# Patient Record
Sex: MALE | Race: BLACK OR AFRICAN AMERICAN | NOT HISPANIC OR LATINO | Employment: UNEMPLOYED | URBAN - METROPOLITAN AREA
[De-identification: names, ages, dates, MRNs, and addresses within clinical notes are randomized per-mention and may not be internally consistent; named-entity substitution may affect disease eponyms.]

---

## 2017-01-06 ENCOUNTER — OFFICE VISIT - HEALTHEAST (OUTPATIENT)
Dept: INTERNAL MEDICINE | Facility: CLINIC | Age: 37
End: 2017-01-06

## 2017-01-06 DIAGNOSIS — I10 HYPERTENSION: ICD-10-CM

## 2017-01-06 DIAGNOSIS — F98.8 ADD (ATTENTION DEFICIT DISORDER): ICD-10-CM

## 2017-01-06 DIAGNOSIS — F32.A DEPRESSION: ICD-10-CM

## 2017-01-06 RX ORDER — TADALAFIL 20 MG/1
20 TABLET ORAL
Status: SHIPPED | COMMUNITY
Start: 2016-11-17 | End: 2022-05-24 | Stop reason: ALTCHOICE

## 2017-03-24 ENCOUNTER — COMMUNICATION - HEALTHEAST (OUTPATIENT)
Dept: INTERNAL MEDICINE | Facility: CLINIC | Age: 37
End: 2017-03-24

## 2018-07-03 ENCOUNTER — COMMUNICATION - HEALTHEAST (OUTPATIENT)
Dept: INTERNAL MEDICINE | Facility: CLINIC | Age: 38
End: 2018-07-03

## 2018-07-03 DIAGNOSIS — N52.9 ERECTILE DYSFUNCTION, UNSPECIFIED ERECTILE DYSFUNCTION TYPE: ICD-10-CM

## 2018-08-14 ENCOUNTER — COMMUNICATION - HEALTHEAST (OUTPATIENT)
Dept: INTERNAL MEDICINE | Facility: CLINIC | Age: 38
End: 2018-08-14

## 2018-08-14 DIAGNOSIS — F98.8 ADD (ATTENTION DEFICIT DISORDER): ICD-10-CM

## 2019-01-07 ENCOUNTER — COMMUNICATION - HEALTHEAST (OUTPATIENT)
Dept: SCHEDULING | Facility: CLINIC | Age: 39
End: 2019-01-07

## 2019-01-07 ENCOUNTER — OFFICE VISIT - HEALTHEAST (OUTPATIENT)
Dept: INTERNAL MEDICINE | Facility: CLINIC | Age: 39
End: 2019-01-07

## 2019-01-07 DIAGNOSIS — F11.11 HISTORY OF OPIOID ABUSE (H): ICD-10-CM

## 2019-01-07 DIAGNOSIS — M54.42 CHRONIC BILATERAL LOW BACK PAIN WITH BILATERAL SCIATICA: ICD-10-CM

## 2019-01-07 DIAGNOSIS — M54.41 CHRONIC BILATERAL LOW BACK PAIN WITH BILATERAL SCIATICA: ICD-10-CM

## 2019-01-07 DIAGNOSIS — G89.29 CHRONIC BILATERAL LOW BACK PAIN WITH BILATERAL SCIATICA: ICD-10-CM

## 2019-01-07 DIAGNOSIS — I10 ESSENTIAL HYPERTENSION: ICD-10-CM

## 2019-01-07 DIAGNOSIS — F90.2 ATTENTION DEFICIT HYPERACTIVITY DISORDER (ADHD), COMBINED TYPE: ICD-10-CM

## 2019-01-07 DIAGNOSIS — F33.1 MODERATE EPISODE OF RECURRENT MAJOR DEPRESSIVE DISORDER (H): ICD-10-CM

## 2019-01-07 ASSESSMENT — MIFFLIN-ST. JEOR: SCORE: 1666.03

## 2019-01-08 ENCOUNTER — AMBULATORY - HEALTHEAST (OUTPATIENT)
Dept: LAB | Facility: CLINIC | Age: 39
End: 2019-01-08

## 2019-01-08 DIAGNOSIS — F90.2 ATTENTION DEFICIT HYPERACTIVITY DISORDER, COMBINED TYPE: ICD-10-CM

## 2019-01-08 LAB
AMPHETAMINES UR QL SCN: NORMAL
BARBITURATES UR QL: NORMAL
BENZODIAZ UR QL: NORMAL
CANNABINOIDS UR QL SCN: NORMAL
COCAINE UR QL: NORMAL
CREAT UR-MCNC: 15.4 MG/DL
ERYTHROCYTE [DISTWIDTH] IN BLOOD BY AUTOMATED COUNT: 11.9 % (ref 11–14.5)
HCT VFR BLD AUTO: 47.9 % (ref 40–54)
HGB BLD-MCNC: 16.2 G/DL (ref 14–18)
MCH RBC QN AUTO: 29.8 PG (ref 27–34)
MCHC RBC AUTO-ENTMCNC: 33.9 G/DL (ref 32–36)
MCV RBC AUTO: 88 FL (ref 80–100)
METHADONE UR QL SCN: NORMAL
OPIATES UR QL SCN: NORMAL
OXYCODONE UR QL: NORMAL
PCP UR QL SCN: NORMAL
PLATELET # BLD AUTO: 332 THOU/UL (ref 140–440)
PMV BLD AUTO: 7.2 FL (ref 7–10)
RBC # BLD AUTO: 5.44 MILL/UL (ref 4.4–6.2)
WBC: 6.3 THOU/UL (ref 4–11)

## 2019-01-09 LAB
ALBUMIN SERPL-MCNC: 4 G/DL (ref 3.5–5)
ALP SERPL-CCNC: 74 U/L (ref 45–120)
ALT SERPL W P-5'-P-CCNC: 35 U/L (ref 0–45)
ANION GAP SERPL CALCULATED.3IONS-SCNC: 10 MMOL/L (ref 5–18)
AST SERPL W P-5'-P-CCNC: 22 U/L (ref 0–40)
BILIRUB SERPL-MCNC: 0.4 MG/DL (ref 0–1)
BUN SERPL-MCNC: 9 MG/DL (ref 8–22)
CALCIUM SERPL-MCNC: 9.3 MG/DL (ref 8.5–10.5)
CHLORIDE BLD-SCNC: 104 MMOL/L (ref 98–107)
CO2 SERPL-SCNC: 27 MMOL/L (ref 22–31)
CREAT SERPL-MCNC: 1.3 MG/DL (ref 0.7–1.3)
GFR SERPL CREATININE-BSD FRML MDRD: >60 ML/MIN/1.73M2
GLUCOSE BLD-MCNC: 143 MG/DL (ref 70–125)
POTASSIUM BLD-SCNC: 4.1 MMOL/L (ref 3.5–5)
PROT SERPL-MCNC: 7.1 G/DL (ref 6–8)
SODIUM SERPL-SCNC: 141 MMOL/L (ref 136–145)
TSH SERPL DL<=0.005 MIU/L-ACNC: 0.94 UIU/ML (ref 0.3–5)
VIT B12 SERPL-MCNC: 447 PG/ML (ref 213–816)

## 2019-01-10 ENCOUNTER — COMMUNICATION - HEALTHEAST (OUTPATIENT)
Dept: INTERNAL MEDICINE | Facility: CLINIC | Age: 39
End: 2019-01-10

## 2019-01-10 DIAGNOSIS — F90.2 ATTENTION DEFICIT HYPERACTIVITY DISORDER (ADHD), COMBINED TYPE: ICD-10-CM

## 2019-01-10 LAB — 25(OH)D3 SERPL-MCNC: 5.9 NG/ML (ref 30–80)

## 2019-01-11 ENCOUNTER — COMMUNICATION - HEALTHEAST (OUTPATIENT)
Dept: INTERNAL MEDICINE | Facility: CLINIC | Age: 39
End: 2019-01-11

## 2019-02-27 ENCOUNTER — AMBULATORY - HEALTHEAST (OUTPATIENT)
Dept: BEHAVIORAL HEALTH | Facility: CLINIC | Age: 39
End: 2019-02-27

## 2019-03-20 ENCOUNTER — COMMUNICATION - HEALTHEAST (OUTPATIENT)
Dept: SCHEDULING | Facility: CLINIC | Age: 39
End: 2019-03-20

## 2019-04-01 ENCOUNTER — COMMUNICATION - HEALTHEAST (OUTPATIENT)
Dept: INTERNAL MEDICINE | Facility: CLINIC | Age: 39
End: 2019-04-01

## 2019-04-01 ENCOUNTER — OFFICE VISIT - HEALTHEAST (OUTPATIENT)
Dept: INTERNAL MEDICINE | Facility: CLINIC | Age: 39
End: 2019-04-01

## 2019-04-01 DIAGNOSIS — M54.42 CHRONIC BILATERAL LOW BACK PAIN WITH BILATERAL SCIATICA: ICD-10-CM

## 2019-04-01 DIAGNOSIS — N52.9 ERECTILE DYSFUNCTION, UNSPECIFIED ERECTILE DYSFUNCTION TYPE: ICD-10-CM

## 2019-04-01 DIAGNOSIS — M54.50 ACUTE LEFT-SIDED LOW BACK PAIN WITHOUT SCIATICA: ICD-10-CM

## 2019-04-01 DIAGNOSIS — M54.41 CHRONIC BILATERAL LOW BACK PAIN WITH BILATERAL SCIATICA: ICD-10-CM

## 2019-04-01 DIAGNOSIS — I10 ESSENTIAL HYPERTENSION: ICD-10-CM

## 2019-04-01 DIAGNOSIS — G89.29 CHRONIC BILATERAL LOW BACK PAIN WITH BILATERAL SCIATICA: ICD-10-CM

## 2019-06-11 ENCOUNTER — COMMUNICATION - HEALTHEAST (OUTPATIENT)
Dept: INTERNAL MEDICINE | Facility: CLINIC | Age: 39
End: 2019-06-11

## 2019-07-17 ENCOUNTER — COMMUNICATION - HEALTHEAST (OUTPATIENT)
Dept: INTERNAL MEDICINE | Facility: CLINIC | Age: 39
End: 2019-07-17

## 2020-08-04 ENCOUNTER — OFFICE VISIT - HEALTHEAST (OUTPATIENT)
Dept: INTERNAL MEDICINE | Facility: CLINIC | Age: 40
End: 2020-08-04

## 2020-08-04 ENCOUNTER — COMMUNICATION - HEALTHEAST (OUTPATIENT)
Dept: INTERNAL MEDICINE | Facility: CLINIC | Age: 40
End: 2020-08-04

## 2020-08-04 ENCOUNTER — COMMUNICATION - HEALTHEAST (OUTPATIENT)
Dept: LAB | Facility: CLINIC | Age: 40
End: 2020-08-04

## 2020-08-04 DIAGNOSIS — A54.9 GONORRHEA: ICD-10-CM

## 2020-08-04 DIAGNOSIS — G47.10 EXCESSIVE SOMNOLENCE DISORDER: ICD-10-CM

## 2020-08-04 DIAGNOSIS — Z23 NEED FOR PROPHYLACTIC VACCINATION WITH TETANUS-DIPHTHERIA (TD): ICD-10-CM

## 2020-08-04 DIAGNOSIS — Z11.4 ENCOUNTER FOR SCREENING FOR HIV: ICD-10-CM

## 2020-08-04 DIAGNOSIS — Z13.220 LIPID SCREENING: ICD-10-CM

## 2020-08-04 DIAGNOSIS — F33.2 SEVERE EPISODE OF RECURRENT MAJOR DEPRESSIVE DISORDER, WITHOUT PSYCHOTIC FEATURES (H): ICD-10-CM

## 2020-08-04 DIAGNOSIS — M54.42 CHRONIC BILATERAL LOW BACK PAIN WITH BILATERAL SCIATICA: ICD-10-CM

## 2020-08-04 DIAGNOSIS — M54.41 CHRONIC BILATERAL LOW BACK PAIN WITH BILATERAL SCIATICA: ICD-10-CM

## 2020-08-04 DIAGNOSIS — Z51.81 ENCOUNTER FOR THERAPEUTIC DRUG MONITORING: ICD-10-CM

## 2020-08-04 DIAGNOSIS — F90.2 ATTENTION DEFICIT HYPERACTIVITY DISORDER (ADHD), COMBINED TYPE: ICD-10-CM

## 2020-08-04 DIAGNOSIS — N52.9 ERECTILE DYSFUNCTION, UNSPECIFIED ERECTILE DYSFUNCTION TYPE: ICD-10-CM

## 2020-08-04 DIAGNOSIS — I10 ESSENTIAL HYPERTENSION: ICD-10-CM

## 2020-08-04 DIAGNOSIS — G89.29 CHRONIC BILATERAL LOW BACK PAIN WITH BILATERAL SCIATICA: ICD-10-CM

## 2020-08-04 LAB
ERYTHROCYTE [DISTWIDTH] IN BLOOD BY AUTOMATED COUNT: 12.6 % (ref 11–14.5)
HCT VFR BLD AUTO: 48.7 % (ref 40–54)
HGB BLD-MCNC: 16.1 G/DL (ref 14–18)
MCH RBC QN AUTO: 30 PG (ref 27–34)
MCHC RBC AUTO-ENTMCNC: 33 G/DL (ref 32–36)
MCV RBC AUTO: 91 FL (ref 80–100)
PLATELET # BLD AUTO: 360 THOU/UL (ref 140–440)
PMV BLD AUTO: 7.3 FL (ref 7–10)
RBC # BLD AUTO: 5.35 MILL/UL (ref 4.4–6.2)
WBC: 5.2 THOU/UL (ref 4–11)

## 2020-08-04 RX ORDER — BUPROPION HYDROCHLORIDE 150 MG/1
150 TABLET ORAL EVERY MORNING
Qty: 30 TABLET | Refills: 11 | Status: SHIPPED | OUTPATIENT
Start: 2020-08-04 | End: 2021-08-04

## 2020-08-04 ASSESSMENT — MIFFLIN-ST. JEOR: SCORE: 1689.84

## 2020-08-04 ASSESSMENT — PATIENT HEALTH QUESTIONNAIRE - PHQ9: SUM OF ALL RESPONSES TO PHQ QUESTIONS 1-9: 16

## 2020-08-05 LAB
ALBUMIN SERPL-MCNC: 4.2 G/DL (ref 3.5–5)
ALP SERPL-CCNC: 77 U/L (ref 45–120)
ALT SERPL W P-5'-P-CCNC: 20 U/L (ref 0–45)
ANION GAP SERPL CALCULATED.3IONS-SCNC: 13 MMOL/L (ref 5–18)
AST SERPL W P-5'-P-CCNC: 17 U/L (ref 0–40)
BILIRUB SERPL-MCNC: 0.4 MG/DL (ref 0–1)
BUN SERPL-MCNC: 13 MG/DL (ref 8–22)
CALCIUM SERPL-MCNC: 9.7 MG/DL (ref 8.5–10.5)
CHLORIDE BLD-SCNC: 101 MMOL/L (ref 98–107)
CO2 SERPL-SCNC: 26 MMOL/L (ref 22–31)
CREAT SERPL-MCNC: 1.51 MG/DL (ref 0.7–1.3)
GFR SERPL CREATININE-BSD FRML MDRD: 51 ML/MIN/1.73M2
GLUCOSE BLD-MCNC: 99 MG/DL (ref 70–125)
HIV 1+2 AB+HIV1 P24 AG SERPL QL IA: NEGATIVE
POTASSIUM BLD-SCNC: 4.2 MMOL/L (ref 3.5–5)
PROLACTIN SERPL-MCNC: 7.6 NG/ML (ref 0–15)
PROT SERPL-MCNC: 7.4 G/DL (ref 6–8)
SODIUM SERPL-SCNC: 140 MMOL/L (ref 136–145)
TSH SERPL DL<=0.005 MIU/L-ACNC: 1 UIU/ML (ref 0.3–5)

## 2020-08-07 LAB — TESTOST SERPL-MCNC: 695 NG/DL (ref 240–871)

## 2021-01-09 ENCOUNTER — COMMUNICATION - HEALTHEAST (OUTPATIENT)
Dept: INTERNAL MEDICINE | Facility: CLINIC | Age: 41
End: 2021-01-09

## 2021-01-09 DIAGNOSIS — N52.9 ERECTILE DYSFUNCTION, UNSPECIFIED ERECTILE DYSFUNCTION TYPE: ICD-10-CM

## 2021-01-09 DIAGNOSIS — I10 ESSENTIAL HYPERTENSION: ICD-10-CM

## 2021-01-09 RX ORDER — HYDROCHLOROTHIAZIDE 12.5 MG/1
12.5 TABLET ORAL DAILY
Qty: 90 TABLET | Refills: 1 | Status: SHIPPED | OUTPATIENT
Start: 2021-01-09 | End: 2021-08-04

## 2021-01-09 RX ORDER — SILDENAFIL 100 MG/1
TABLET, FILM COATED ORAL
Qty: 20 TABLET | Refills: 3 | Status: SHIPPED | OUTPATIENT
Start: 2021-01-09 | End: 2021-08-04

## 2021-01-09 RX ORDER — LISINOPRIL 20 MG/1
20 TABLET ORAL DAILY
Qty: 90 TABLET | Refills: 1 | Status: SHIPPED | OUTPATIENT
Start: 2021-01-09 | End: 2021-08-04

## 2021-04-30 ENCOUNTER — COMMUNICATION - HEALTHEAST (OUTPATIENT)
Dept: INTERNAL MEDICINE | Facility: CLINIC | Age: 41
End: 2021-04-30

## 2021-04-30 ENCOUNTER — AMBULATORY - HEALTHEAST (OUTPATIENT)
Dept: FAMILY MEDICINE | Facility: CLINIC | Age: 41
End: 2021-04-30

## 2021-04-30 DIAGNOSIS — B00.9 HERPES SIMPLEX VIRUS INFECTION: ICD-10-CM

## 2021-05-27 ASSESSMENT — PATIENT HEALTH QUESTIONNAIRE - PHQ9: SUM OF ALL RESPONSES TO PHQ QUESTIONS 1-9: 16
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
## 2021-05-30 VITALS — WEIGHT: 177.9 LBS | BODY MASS INDEX: 30.3 KG/M2

## 2021-06-02 ENCOUNTER — RECORDS - HEALTHEAST (OUTPATIENT)
Dept: ADMINISTRATIVE | Facility: CLINIC | Age: 41
End: 2021-06-02

## 2021-06-02 VITALS — WEIGHT: 185 LBS | BODY MASS INDEX: 30.82 KG/M2 | HEIGHT: 65 IN

## 2021-06-02 VITALS — WEIGHT: 191 LBS | BODY MASS INDEX: 31.78 KG/M2

## 2021-06-04 VITALS
HEIGHT: 66 IN | WEIGHT: 186.75 LBS | SYSTOLIC BLOOD PRESSURE: 154 MMHG | DIASTOLIC BLOOD PRESSURE: 100 MMHG | OXYGEN SATURATION: 98 % | HEART RATE: 98 BPM | RESPIRATION RATE: 16 BRPM | BODY MASS INDEX: 30.01 KG/M2

## 2021-06-08 NOTE — PROGRESS NOTES
ASSESSMENT:  1.  Adult attention deficit disorder:  Patient wishes to go back on Adderall.  He reports he previously did well on 30 mg twice daily.  Reports had trouble sleeping on the sustained release preparation in the past.  He does appear extremely agitated and hyperactive with pressured speech.  2.  Major depression:  He scores 19 on PHQ-9.  He denies suicidal intent.  He is no longer taking Viibryd or other antidepressants.  Reports that he felt he did well on Paxil in the past.  He has some characteristics that would suggest a bipolar disorder.  He reports he was never diagnosed with this in the past.  3.  Low back pain:  He is no longer taking gabapentin.  He did not feel it was of any benefit.  He is not interested in any evaluation of the back at the present time.  4.  Hypertension:  Blood pressure is significantly elevated.  He has run out of lisinopril.  He reports that he had excellent control with this in the past.      PLAN:  1.  Refill Adderall 30 mg twice daily.  A one-month prescription was authorized.  2.  Restart lisinopril 20 mg daily  3.  Paxil 20 mg one half daily for 6 days then 20 mg daily.  4.  Clinic follow-up in one month to assess medication changes.  He may also benefit from a mood stabilizer in the future              ASSESSED PROBLEMS:  1. Depression  PARoxetine (PAXIL) 20 MG tablet   2. ADD (attention deficit disorder)  dextroamphetamine-amphetamine (ADDERALL) 30 mg Tab   3. Hypertension  lisinopril (PRINIVIL,ZESTRIL) 20 MG tablet       CHIEF COMPLAINT:  Chief Complaint   Patient presents with     Follow-up     refill meds       HISTORY OF PRESENT ILLNESS:  Zacarias is a 36 y.o. male presenting to the clinic today for a mediation refill.      Hypertension: He has not been taking Lisinopril lately but states that it was effective when he was taking it.     ADD: Since November 2017 he has not taken any ADD medications because he was awaiting a referal to a new mental health practitioner  at [facility].  He feels that his life has been chaotic and regressing somewhat lately. He attributes much of his distress to the psychiatrist that he saw at [facility] who did not provide an assessment or plan in her notes, prompting Dr. López to deny any prescription for Adderall. He returned to see her and, by his report and records, became very upset that she had not provided affirmation for Adderall prescription.She attempted to prescribed him extended release Adderall. The tablet form of Adderall has been effective but extended release has caused insomnia and lags throughout the day.     Insomnia: He has been taking amitriptyline at bed time.     Depression:  He stated that he is feeling very depressed; he can not hold a job and that it was better when he was nery to focus better when on the short acting Adderall. He is unsure if he is taking any antidepressants right now. His psychiatrist prescribed Lexapro to him but he is unsure if he is taking that. He recalls that Paxil has been more effective in the past.  He adds that he had no side effects while taking Paxil, while most other antidepressants have caused issues.   He states that he has been on mood stabilizers in the past with good results. He is unsure if he has a history of bipolar.     ED: He has not been able to obtain Viagra or Cialis as he was told by his pharmacy, when presenting his coupon, that insurance would cover it for free. Of note, he is no longer taking acyclovir but would like a refill in case he has a flare of herpes.     Back pain: He reports that he saw someone for back pain at the [facility] but did not feel that his issues were fully addressed. He is no longer taking gabapentin.      REVIEW OF SYSTEMS:   He denies any heart fluttering.   All other systems are negative.    PFSH:  He has not been able to hold a job recently.  His mother has a history of bipolar.     TOBACCO USE:  History   Smoking Status     Never Smoker    Smokeless Tobacco     Not on file       VITALS:  Vitals:    01/06/17 1459 01/06/17 1507   BP: (!) 130/100 (!) 134/94   Patient Site: Left Arm Left Arm   Patient Position: Sitting Sitting   Cuff Size: Adult Regular Adult Regular   Pulse: (!) 120    Weight: 177 lb 14.4 oz (80.7 kg)      Wt Readings from Last 3 Encounters:   01/06/17 177 lb 14.4 oz (80.7 kg)   11/16/16 178 lb (80.7 kg)   09/28/16 174 lb 8 oz (79.2 kg)     Body mass index is 30.3 kg/(m^2).    PHYSICAL EXAM:   Constitutional:   Reveals an talkative male who seems very hyperactive with pressured speech, appears somewhat agitated. Well groomed..  Vitals: per nursing notes.  Detailed exam not done.     Student Physical Exam:  General: Revealed a well groomed,  male, with very sporadic, agitated, hyperactive speech, inserting occasional swear words.  Vitals Per nursing.  No detailed physical exam completed.    QUALITY MEASURES:  The following are part of a depression follow up plan for the patient:  under care of mental health counselor, coping support assessment, coping support management, mental health care assessment and mental health care management    ADDITIONAL HISTORY SUMMARIZED (2): Notes from Dr. López on 11/16/2016 reviewed regarding ADD. History obtained from Vijay Ferreira regarding ADD and medication management. Notes from CATINA Damian, a psychiatrist practitioner, reviewed regarding ADD.   DECISION TO OBTAIN EXTRA INFORMATION (1): Care everywhere accessed.    RADIOLOGY TESTS (1): None.  LABS (1): None.  MEDICINE TESTS (1): None.  INDEPENDENT REVIEW (2 each): None.     The visit lasted a total of 19 minutes face to face with the patient. Over 50% of the time was spent counseling and educating the patient about ADD. An untimed portion of this visit was spent with Vijay Ferreira, an NP student. This portion was spent gathering history and this history along with the history of Dr. Garcia has been merged as seen above in the HPI,  ROS, and [facility].    I, Gino Delcid, am scribing for and in the presence of, Dr. Garcia.    I, Dr. Garcia, personally performed the services described in this documentation, as scribed by Gino Delcid in my presence, and it is both accurate and complete.    MEDICATIONS:  Current Outpatient Prescriptions   Medication Sig Dispense Refill     tadalafil (CIALIS) 20 MG tablet Take 20 mg by mouth.       acyclovir (ZOVIRAX) 400 MG tablet Take 400 mg by mouth 2 (two) times a day.       amitriptyline (ELAVIL) 25 MG tablet Take 1 tablet (25 mg total) by mouth bedtime. 30 tablet 11     dextroamphetamine-amphetamine (ADDERALL) 30 mg Tab Take 30 mg by mouth 2 (two) times a day. 60 tablet 0     gabapentin (NEURONTIN) 300 MG capsule Take 1 capsule (300 mg total) by mouth 2 (two) times a day. 60 capsule 11     lisinopril (PRINIVIL,ZESTRIL) 20 MG tablet Take 20 mg by mouth daily.       meloxicam (MOBIC) 7.5 MG tablet Take 1 tablet (7.5 mg total) by mouth 2 (two) times a day. 60 tablet 3     naproxen (NAPROSYN) 500 MG tablet Take 1 tablet (500 mg total) by mouth 2 (two) times a day with meals. 60 tablet 2     sertraline (ZOLOFT) 100 MG tablet Take 100 mg by mouth daily.       sildenafil (VIAGRA) 100 MG tablet Take 1 tablet (100 mg total) by mouth as needed for erectile dysfunction. 3 tablet 11     triamcinolone (KENALOG) 0.5 % cream Apply 1 application topically 2 (two) times a day as needed.       vilazodone (VIIBRYD) 40 mg Tab tablet Take 1 tablet (40 mg total) by mouth daily. 30 tablet 11     No current facility-administered medications for this visit.        Total data points: 3.

## 2021-06-10 NOTE — PROGRESS NOTES
Zacarias: Your testosterone level returned in the normal range at 695.  Prolactin and thyroid tests also were in the normal range.  The creatinine, (a kidney test), is mildly elevated at 1.51.  This should be monitored in the future.  It is important to keep your blood pressure in the normal range to avoid kidney injury in the future.  Other labs including your potassium, blood sugar, hemoglobin, and liver enzymes are normal.  A screening test for HIV returned negative.  You will need to check the urine test to evaluate for GC and chlamydia.    Matias Garcia MD

## 2021-06-10 NOTE — PROGRESS NOTES
ASSESSMENT:  1.  Essential hypertension:    Payton previously was treated with lisinopril and HCTZ.  He reports he has been out of these medications for about 6 months.  Blood pressure is high as expected.  He does complain of urinary frequency and urgency.  I would favor cutting HCTZ to 12.5 mg daily and continuing lisinopril.  If blood pressure is above goal as expected on this, amlodipine would be added.    2.  Erectile dysfunction:  Complains of significant issues with this.  Labs will be ordered to check for endocrine abnormalities.  Viagra will be offered.    3.  Major depression:  He describes all sorts of situational stresses.  He scores 16 on PHQ 9.  He has been on multiple antidepressants in the past.  He is concerned about how these could contribute to erectile difficulties.  After discussion, he will try Wellbutrin.  Potential benefits and adverse effects were reviewed at length.    4.  Attention deficit disorder:   Reports that he previously was on immediate release Adderall 30 mg twice daily.  He has been off of this for a number of months.  He certainly has symptoms suggesting attention deficit.  Chart indicates previous issues with chemical dependency.  I would favor checking a urine tox screen prior to therapy    5.  Low back pain:  Radicular.  He acknowledges it is aggravated by situational stress.  He requests hydrocodone.  I would not advise a narcotic.  I also would avoid NSAIDs given his hypertension and GI symptoms.  Stress reduction, traction and exercise were encouraged.    6.  History of gonorrhea:  He was treated for this in [place] but never filled Zithromax.  His girlfriend wonders about re-exposure.  Urine GC and chlamydia will be checked.    7.  Abdominal complaints:  He notes intermittent diarrhea, constipation and abdominal cramping.  I suspect symptoms are related to irritable bowel and aggravated by situational stress.    8.  Excessive somnolence:  He reports that he has problems with  excessive somnolence and has actually dozed off driving and also during intercourse.  He does not have symptoms to suggest obstructive sleep apnea according to his girlfriend.  Sleep evaluation could be considered for the future    PLAN:  1.  Start lisinopril 20 mg daily.  Also start HCTZ 12.5 mg daily    2.  Fill Viagra 100 mg 1/2-1 as needed for erectile dysfunction    3.  Labs as outlined.  He will be notified of test results.    4.  Urine tox screen would need to be done prior to considering restarting Adderall.    5.  Start Wellbutrin  mg daily    6.  Monitor blood pressure.  Reassess in 1 month.  I expect that amlodipine may need to be added.    7.  Further evaluation for probable irritable bowel and sleep disorder in the future.  He was counseled that his best to limit how many medications are started on one visit.  Orders Placed This Encounter   Procedures     Chlamydia trachomatis & Neisseria gonorrhoeae, Amplified Detection     Standing Status:   Future     Standing Expiration Date:   8/4/2021     Order Specific Question:   Specimen Source?     Answer:   Urine     Td, Preservative Free (green label)     HIV Antigen/Antibody Screening [place]     Testosterone, Total     Thyroid Stimulating Hormone (TSH)     Prolactin     Comprehensive Metabolic Panel     HM2(CBC w/o Differential)     Drug Abuse 1+, Urine     Standing Status:   Future     Standing Expiration Date:   8/4/2021     Medications Discontinued During This Encounter   Medication Reason     hydroCHLOROthiazide (HYDRODIURIL) 25 MG tablet Dose adjustment     sildenafil (VIAGRA) 100 MG tablet Reorder     lisinopril (PRINIVIL,ZESTRIL) 20 MG tablet Reorder     triamcinolone (KENALOG) 0.5 % cream Therapy completed     phenazopyridine (PYRIDIUM) 95 MG tablet Therapy completed     ibuprofen (ADVIL,MOTRIN) 800 MG tablet Therapy completed     HYDROcodone-acetaminophen 5-325 mg per tablet Therapy completed     gabapentin (NEURONTIN) 300 MG capsule Therapy  completed     dextroamphetamine-amphetamine (ADDERALL) 30 mg Tab Therapy completed     acyclovir (ZOVIRAX) 400 MG tablet Allergic response       Return in about 4 weeks (around 9/1/2020) for Recheck.    ASSESSED PROBLEMS:  1. Encounter for screening for HIV  HIV Antigen/Antibody Screening [place]   2. Attention deficit hyperactivity disorder (ADHD), combined type     3. Gonorrhea  Chlamydia trachomatis & Neisseria gonorrhoeae, Amplified Detection    CANCELED: Chlamydia trachomatis & Neisseria gonorrhoeae, Amplified Detection   4. Erectile dysfunction, unspecified erectile dysfunction type  sildenafiL (VIAGRA) 100 MG tablet    Testosterone, Total    Thyroid Stimulating Hormone (TSH)    Prolactin   5. Essential hypertension  lisinopriL (PRINIVIL,ZESTRIL) 20 MG tablet    hydroCHLOROthiazide (HYDRODIURIL) 12.5 MG tablet    Comprehensive Metabolic Panel   6. Chronic bilateral low back pain with bilateral sciatica     7. Lipid screening     8. Need for prophylactic vaccination with tetanus-diphtheria (Td)  Td, Preservative Free (green label)   9. Encounter for therapeutic drug monitoring  HM2(CBC w/o Differential)    Drug Abuse 1+, Urine    CANCELED: Drug Abuse 1+, Urine   10. Severe episode of recurrent major depressive disorder, without psychotic features (H)  buPROPion (WELLBUTRIN XL) 150 MG 24 hr tablet   11. Excessive somnolence disorder         CHIEF COMPLAINT:  Chief Complaint   Patient presents with     Follow-up     meds, hypertension     Depression     PHQ 9 = 16       HISTORY OF PRESENT ILLNESS:  Zacarias is a 40 y.o. male who presents with multiple concerns.  I have not seen him for a number of years.  He been treated with the lisinopril and HCTZ for hypertension.  He has been out of this for 6 months.  Blood pressure is high as expected.  He does note urinary frequency and urgency which is a chronic issue..    He also complains of erectile dysfunction.  He had used Viagra or Cialis in the past with some benefit.  " He is concerned about possibly having a low testosterone level    Scores 16 on PHQ 9.  He acknowledges that he is very anxious and has marked situational stressors.  He has been on multiple antidepressant medicines in the past.  He is concerned that these could contribute to his sexual dysfunction    Requests a refill of Adderall.  He formerly took immediate release Adderall twice daily, but has been off for a number of months.  He has had previous issues with chemical dependency.  He was advised that a tox screen needs to be done prior to considering one with Adderall.  Does have multiple symptoms to suggest attention deficit.    He was treated for GC in January.  He does not have any current symptoms now but his girlfriend is concerned about potential infection.  A urine test for GC and chlamydia will be checked.    Complains of excess somnolence.  He states he has dozed off when talking, with sex, and with driving.  His girlfriend reports that he snores but has not noted apnea.    He also complains of episodic abdominal cramping, diarrhea, and periods of constipation.  Acknowledges that this is worse during periods of situational stress.    He notes intermittent low back pain.  Requests a prescription for hydrocodone.  Discomfort seems worse during periods of stress.  It is nonradicular.  I do not see an indication for narcotic at this time.    REVIEW OF SYSTEMS:  He has numerous somatic complaints  Comprehensive review of systems is negative.    PFSH:  Seen with his girlfriend    TOBACCO USE:  Social History     Tobacco Use   Smoking Status Never Smoker   Smokeless Tobacco Never Used       VITALS:  Vitals:    08/04/20 1457 08/04/20 1505   BP: (!) 174/98 (!) 154/100   Patient Site: Right Arm Right Arm   Patient Position: Sitting Sitting   Cuff Size: Adult Large Adult Large   Pulse: 98    Resp: 16    SpO2: 98%    Weight: 186 lb 12 oz (84.7 kg)    Height: 5' 6\" (1.676 m)      Wt Readings from Last 3 Encounters: "   08/04/20 186 lb 12 oz (84.7 kg)   03/13/20 185 lb (83.9 kg)   01/10/20 191 lb (86.6 kg)       PHYSICAL EXAM:  Constitutional:   Reveals an alert talkative well-groomed man.  He seems rather anxious but with appropriate affect.  Vitals: per nursing notes.  HEENT: Atraumatic  Eyes:  EOMs full, PERRL.  No exophthalmos  Oropharynx:   Mouth and throat clear, no thrush or exudate.  Neck:  Supple, no carotid bruits or adenopathy.  Back:  No spine or CVA pain.  Thorax:  No bony deformities.  Lungs: Clear to A&P without rales or wheezes.  Respiratory effort normal.  Cardiac:   Regular rate and rhythm, normal S1, S2, no murmur or gallop.  Tachycardic with a pulse of about 100  Abdomen:  Soft, active bowel sounds without bruits, mass, or underneath  Extremities:   No peripheral edema, pulses in the feet intact.    Skin:  No jaundice, peripheral cyanosis or lesions to suggest malignancy.  Neuro:  Alert and orient  No gross focal deficits.  Psychiatric:  Memory intact, mood appropriate.    DATA REVIEWED:  Additional History from Old Records Summarized (2): None.  Decision to Obtain Records (1): None.   Radiology Tests Summarized or Ordered (1): None.  Labs Reviewed or Ordered (1): None.  Medicine Test Summarized or Ordered (1): None.   Independent Review of EKG or X-RAY(2 each): None.    The visit lasted a total of 58 minutes face to face with the patient. Over 50% of the time was spent counseling and educating the patient about multiple issues..    Dragon dictation was used for this note. Speech recognition errors are a possibility.     MEDICATIONS:  Current Outpatient Medications   Medication Sig Dispense Refill     acetaminophen (TYLENOL) 325 MG tablet Take 2 tablets (650 mg total) by mouth every 8 (eight) hours as needed for pain. 30 tablet 0     lisinopriL (PRINIVIL,ZESTRIL) 20 MG tablet Take 1 tablet (20 mg total) by mouth daily. 90 tablet 3     sildenafiL (VIAGRA) 100 MG tablet 1/2-1 tab as needed for erectile  dysfunction 20 tablet 3     tadalafil (CIALIS) 20 MG tablet Take 20 mg by mouth.       buPROPion (WELLBUTRIN XL) 150 MG 24 hr tablet Take 1 tablet (150 mg total) by mouth every morning. 30 tablet 11     hydroCHLOROthiazide (HYDRODIURIL) 12.5 MG tablet Take 1 tablet (12.5 mg total) by mouth daily. 30 tablet 11     No current facility-administered medications for this visit.      Matias Garcia MD

## 2021-06-10 NOTE — PATIENT INSTRUCTIONS - HE
1. Restart Lisinopril 20 mg daily    2.  Start HCTZ.  Lower dose to 12.5 mg daily    3.  Check urine tox screen.  If OK, could then restart Adderall.    4.  Start Wellbutrin  mg daily    5.  Refill Viagra 100 mg    6. See in one month

## 2021-06-10 NOTE — TELEPHONE ENCOUNTER
I told him prior to going to the lab that he needs to have a urine tox screen done prior to considering refilling Adderall.  He is aware of this and should arrange the lab appointment himself.

## 2021-06-10 NOTE — TELEPHONE ENCOUNTER
Patient did not leave urine specimen for DOA 1+ or CGA after OV. Told lab staff doctor knew he was not leaving urine sample. Spoke to PCP and advised if he needed to be called to set up a lab only appt, he agreed that it should be done sooner than later, told patient he would not [facility] rx without urine test. Brought to [facility] to try to set up appt, number on file is not valid, and no consent to communicate or emergency contact listed. Please advise if you would like letter mailed to patient.     Thanks

## 2021-06-14 NOTE — TELEPHONE ENCOUNTER
Refill Request  Did you contact pharmacy: Yes  Medication name:   sildenafiL (VIAGRA) 100 MG tablet 20 tablet 3     hydroCHLOROthiazide (HYDRODIURIL) 12.5 MG tablet 30 tablet     lisinopriL (PRINIVIL,ZESTRIL) 20 MG tablet 90 tablet     acyclovir (ZOVIRAX) 400 MG           Requested Prescriptions      No prescriptions requested or ordered in this encounter     Who prescribed the medication: Matias Garcia MD   Requested Pharmacy: [facility] patient out of medication: No.  0 days left  Patient notified refills processed in 3 business days:  yes  Okay to leave a detailed message: yes

## 2021-06-14 NOTE — TELEPHONE ENCOUNTER
Refill Approved    Rx renewed per Medication Renewal Policy. Medication was last renewed on:  Hydrochlorothiazide 8/4/2020 with 11 refills  Lisinopril 8/4/2020 with 3 refills  Sildenafil 8/4/2020 with 3 refills.   Last office visit: 8/4/2020 with PCP Dr HEATHER Garcia   Change in pharmacy noted. Orders retransmitted.     Nakia Vargas, [facility] Triage/Med Refill 1/9/2021     Requested Prescriptions   Pending Prescriptions Disp Refills     hydroCHLOROthiazide (HYDRODIURIL) 12.5 MG tablet 30 tablet 11     Sig: Take 1 tablet (12.5 mg total) by mouth daily.       Diuretics/Combination Diuretics Refill Protocol  Passed - 1/9/2021  7:36 PM        Passed - Visit with PCP or prescribing provider visit in past 12 months     Last office visit with prescriber/PCP: 8/4/2020 Matias Garcia MD OR same dept: Visit date not found OR same specialty: 8/4/2020 Matias Garcia MD  Last physical: Visit date not found Last MTM visit: Visit date not found   Next visit within 3 mo: Visit date not found  Next physical within 3 mo: Visit date not found  Prescriber OR PCP: Matias Garcia MD  Last diagnosis associated with med order: 1. Erectile dysfunction, unspecified erectile dysfunction type  - sildenafiL (VIAGRA) 100 MG tablet; 1/2-1 tab as needed for erectile dysfunction  Dispense: 20 tablet; Refill: 3    2. Essential hypertension  - hydroCHLOROthiazide (HYDRODIURIL) 12.5 MG tablet; Take 1 tablet (12.5 mg total) by mouth daily.  Dispense: 30 tablet; Refill: 11  - lisinopriL (PRINIVIL,ZESTRIL) 20 MG tablet; Take 1 tablet (20 mg total) by mouth daily.  Dispense: 90 tablet; Refill: 3    If protocol passes may refill for 12 months if within 3 months of last provider visit (or a total of 15 months).             Passed - Serum Potassium in past 12 months      Lab Results   Component Value Date    Potassium 4.2 08/04/2020             Passed - Serum Sodium in past 12 months      Lab Results   Component Value Date    Sodium 140 08/04/2020              Passed - Blood pressure on file in past 12 months     BP Readings from Last 1 Encounters:   20 (!) 154/100             Passed - Serum Creatinine in past 12 months      Creatinine   Date Value Ref Range Status   2020 1.51 (H) 0.70 - 1.30 mg/dL Final                sildenafiL (VIAGRA) 100 MG tablet 20 tablet 3     Si/2-1 tab as needed for erectile dysfunction       Medications for Impotence Refill Protocol Passed - 2021  7:36 PM        Passed - PCP or prescribing provider visit in last year     Last office visit with prescriber/PCP: 2020 Matias Garcia MD OR same dept: Visit date not found OR same specialty: 2020 Matias Garcia MD  Last physical: Visit date not found Last MTM visit: Visit date not found   Next visit within 3 mo: Visit date not found  Next physical within 3 mo: Visit date not found  Prescriber OR PCP: Matias Garcia MD  Last diagnosis associated with med order: 1. Erectile dysfunction, unspecified erectile dysfunction type  - sildenafiL (VIAGRA) 100 MG tablet; 1/2-1 tab as needed for erectile dysfunction  Dispense: 20 tablet; Refill: 3    2. Essential hypertension  - hydroCHLOROthiazide (HYDRODIURIL) 12.5 MG tablet; Take 1 tablet (12.5 mg total) by mouth daily.  Dispense: 30 tablet; Refill: 11  - lisinopriL (PRINIVIL,ZESTRIL) 20 MG tablet; Take 1 tablet (20 mg total) by mouth daily.  Dispense: 90 tablet; Refill: 3    If protocol passes may refill for 12 months if within 3 months of last provider visit (or a total of 15 months).                lisinopriL (PRINIVIL,ZESTRIL) 20 MG tablet 90 tablet 3     Sig: Take 1 tablet (20 mg total) by mouth daily.       Ace Inhibitors Refill Protocol Passed - 2021  7:36 PM        Passed - PCP or prescribing provider visit in past 12 months       Last office visit with prescriber/PCP: 2020 Matias Garcia MD OR same dept: Visit date not found OR same specialty: 2020 Matias Garcia MD  Last physical: Visit date  not found Last MTM visit: Visit date not found   Next visit within 3 mo: Visit date not found  Next physical within 3 mo: Visit date not found  Prescriber OR PCP: Matias Garcia MD  Last diagnosis associated with med order: 1. Erectile dysfunction, unspecified erectile dysfunction type  - sildenafiL (VIAGRA) 100 MG tablet; 1/2-1 tab as needed for erectile dysfunction  Dispense: 20 tablet; Refill: 3    2. Essential hypertension  - hydroCHLOROthiazide (HYDRODIURIL) 12.5 MG tablet; Take 1 tablet (12.5 mg total) by mouth daily.  Dispense: 30 tablet; Refill: 11  - lisinopriL (PRINIVIL,ZESTRIL) 20 MG tablet; Take 1 tablet (20 mg total) by mouth daily.  Dispense: 90 tablet; Refill: 3    If protocol passes may refill for 12 months if within 3 months of last provider visit (or a total of 15 months).             Passed - Serum Potassium in past 12 months     Lab Results   Component Value Date    Potassium 4.2 08/04/2020             Passed - Blood pressure filed in past 12 months     BP Readings from Last 1 Encounters:   08/04/20 (!) 154/100             Passed - Serum Creatinine in past 12 months     Creatinine   Date Value Ref Range Status   08/04/2020 1.51 (H) 0.70 - 1.30 mg/dL Final

## 2021-06-14 NOTE — TELEPHONE ENCOUNTER
RN cannot approve Refill Request: Acyclovir (Zovirax) 400mg     RN can NOT refill this medication medication not on med list. Last office visit: 2020 Matias Garcia MD Last Physical: Visit date not found Last MTM visit: Visit date not found Last visit same specialty: 2020 Matias Garcia MD.  Next visit within 3 mo: Visit date not found  Next physical within 3 mo: Visit date not found      Nakia Vargas, [facility] Triage/Med Refill 2021    Requested Prescriptions   Pending Prescriptions Disp Refills     sildenafiL (VIAGRA) 100 MG tablet 20 tablet 3     Si/2-1 tab as needed for erectile dysfunction       There is no refill protocol information for this order        hydroCHLOROthiazide (HYDRODIURIL) 12.5 MG tablet 30 tablet 11     Sig: Take 1 tablet (12.5 mg total) by mouth daily.       There is no refill protocol information for this order        lisinopriL (PRINIVIL,ZESTRIL) 20 MG tablet 90 tablet 3     Sig: Take 1 tablet (20 mg total) by mouth daily.       There is no refill protocol information for this order

## 2021-06-16 PROBLEM — F11.11 HISTORY OF OPIOID ABUSE (H): Status: ACTIVE | Noted: 2019-01-07

## 2021-06-17 NOTE — TELEPHONE ENCOUNTER
Pt is having a outbreak herpes and needs his acylovir called in to [facility] in [place] his normal one, if unable [phone]

## 2021-06-18 NOTE — LETTER
Letter by Jeet López MD at      Author: Jeet López MD Service: -- Author Type: --    Filed:  Encounter Date: 1/11/2019 Status: (Other)       Zacarias Dailey  [address]  [place] [address]             January 11, 2019         Dear Mr. Dailey,    Below are the results from your recent visit:    Resulted Orders   Drug Abuse 1+, Urine   Result Value Ref Range    Amphetamines Screen Negative Screen Negative    Benzodiazepines Screen Negative Screen Negative    Opiates Screen Negative Screen Negative    Phencyclidine Screen Negative Screen Negative    THC Screen Negative Screen Negative    Barbiturates Screen Negative Screen Negative    Cocaine Metabolite Screen Negative Screen Negative    Methadone Screen Negative Screen Negative    Oxycodone Screen Negative Screen Negative    Creatinine, Urine 15.4 mg/dL      Comment:      Urine very dilute; suggest recollection.    Narrative    Drug                           Screening Threshold    Amphetamines                    1000 ng/mL  Benzodiazepine                   200 ng/mL  Opiates                          300 ng/mL  Phencyclidine                     25 ng/mL  THC Metabolite                    50 ng/mL  Barbiturates                     200 ng/mL  Cocaine Metabolite               150 ng/mL  Methadone                        300 ng/mL  Oxycodone                        100 ng/mL    Screening results are to be used only for medical purposes.  Unconfirmed screening results are not to be used for non-  medical purposes.   Vitamin D, Total (25-Hydroxy)   Result Value Ref Range    Vitamin D, Total (25-Hydroxy) 5.9 (L) 30.0 - 80.0 ng/mL    Narrative    Deficiency <10.0 ng/mL  Insufficiency 10.0-29.9 ng/mL  Sufficiency 30.0-80.0 ng/mL  Toxicity (possible) >100.0 ng/mL   Vitamin B12   Result Value Ref Range    Vitamin B-12 447 213 - 816 pg/mL   Thyroid Stimulating Hormone (TSH)   Result Value Ref Range    TSH 0.94 0.30 - 5.00 uIU/mL   Comprehensive  Metabolic Panel   Result Value Ref Range    Sodium 141 136 - 145 mmol/L    Potassium 4.1 3.5 - 5.0 mmol/L    Chloride 104 98 - 107 mmol/L    CO2 27 22 - 31 mmol/L    Anion Gap, Calculation 10 5 - 18 mmol/L    Glucose 143 (H) 70 - 125 mg/dL    BUN 9 8 - 22 mg/dL    Creatinine 1.30 0.70 - 1.30 mg/dL    GFR MDRD Af Amer >60 >60 mL/min/1.73m2    GFR MDRD Non Af Amer >60 >60 mL/min/1.73m2    Bilirubin, Total 0.4 0.0 - 1.0 mg/dL    Calcium 9.3 8.5 - 10.5 mg/dL    Protein, Total 7.1 6.0 - 8.0 g/dL    Albumin 4.0 3.5 - 5.0 g/dL    Alkaline Phosphatase 74 45 - 120 U/L    AST 22 0 - 40 U/L    ALT 35 0 - 45 U/L    Narrative    Fasting Glucose reference range is 70-99 mg/dL per  American Diabetes Association (ADA) guidelines.   HM2(CBC w/o Differential)   Result Value Ref Range    WBC 6.3 4.0 - 11.0 thou/uL    RBC 5.44 4.40 - 6.20 mill/uL    Hemoglobin 16.2 14.0 - 18.0 g/dL    Hematocrit 47.9 40.0 - 54.0 %    MCV 88 80 - 100 fL    MCH 29.8 27.0 - 34.0 pg    MCHC 33.9 32.0 - 36.0 g/dL    RDW 11.9 11.0 - 14.5 %    Platelets 332 140 - 440 thou/uL    MPV 7.2 7.0 - 10.0 fL       Your vitamin D level is very low.  Please begin high dose vitamin D supplements twice a week for the next 3 months    Your other blood tests looked good    You left the clinic after your office visit on January 7th without leaving a urine sample.  I specifically told you that you had to leave a sample before leaving.  Unfortunately, failure to leave a sample is a violation of our controlled substance policy    You then brought in a urine sample the next day that was too dilute, raising the suspicion from the lab staff that it might not be a true urine sample from you.   This further raises questions about the use of the Adderall.    I am therefore forced to suspend your adderall prescription until we can obtain some second opinions.  You will need to meet with our Pharm DAshely, our psychologist Mann, and in addition, I will need you to meet with a  psychiatrist.  The psychiatrist must specifically address and clarify your diagnosis, and comment on whether taking short acting adderall is a good choice for you with a history of Meth abuse    This matter has been referred to our Executive Team to handle this for the next 90 days.  Our team will handle all matters related to the Adderall, while your primary doctor, Dr Garcia, will handle all other matters of your medical care  Please call with questions or contact us using [facility].    Sincerely,        Electronically signed by Jeet López MD

## 2021-06-18 NOTE — LETTER
Letter by Jeet López MD at      Author: Jeet López MD Service: -- Author Type: --    Filed:  Encounter Date: 1/7/2019 Status: (Other)         [facility] INTERNAL MEDICINE  01/07/19    Patient: Zacarias Dailey  YOB: 1980  Medical Record Number: [id number]  CSN: [id number]                                                                              Non-opioid Controlled Substance Agreement    I understand that my care provider has prescribed a controlled substance to help manage my condition(s). I am taking this medicine to help me function or work. I know this is strong medicine, and that it can cause serious side effects. Controlled substances can be sedating, addicting and may cause a dependency on the drug. They can affect my ability to drive or think, and cause depression. They need to be taken exactly as prescribed. Combining controlled substances with certain medicines or chemicals (such as cocaine, sedatives and tranquilizers, sleeping pills, meth) can be dangerous or even fatal. Also, if I stop controlled substances suddenly, I may have severe withdrawal symptoms.  If not helpful, I may be asked to stop them.    The risks, benefits, and side effects of these medicine(s) were explained to me. I agree that:    1. I will take part in other treatments as advised by my care team. This may be psychiatry or counseling, physical therapy, behavioral therapy, group treatment or a referral to a pain clinic. I will reduce or stop my medicine when my care team tells me to do so.  2. I will take my medicines as prescribed. I will not change the dose or schedule unless my care team tells me to. There will be no refills if I run out early.  I may be contactedwithout warning and asked to complete a urine drug test or pill count at any time.   3. I will keep all my appointments, and understand this is part of the monitoring of controlled substances. My care team may require an office visit for EVERY  controlled substance refill. If I miss appointments or dont follow instructions, my care team may stop my medicine.  4. I will not ask other providers to prescribe controlled substances, and I will not accept controlled substances from other people. If I need another prescribed controlled substance for a new reason, I will tell my care team within 1 business day.  5. I will use one pharmacy to fill all of my controlled substance prescriptions, and it is up to me to make sure that I do not run out of my medicines on weekends or holidays. If my care team is willing to refill my controlled substance prescription without a visit, I must request refills only during office hours, refills may take up to 3 days to process, and it may take up to 5 to 7 days for my medicine to be mailed and ready at my pharmacy. Prescriptions will not be mailed anywhere except my pharmacy.    6. I am responsible for my prescriptions. If the medicine/prescription is lost or stolen, it will not be replaced. I also agree not to share controlled substance medicines with anyone.          [facility] INTERNAL MEDICINE  01/07/19  Patient:  Zacarias Dailey  YOB: 1980  Medical Record Number: [id number]  CSN: [id number]    7. I agree to not use ANY illegal or recreational drugs. This includes marijuana, cocaine, bath salts or other drugs. I agree not to use alcohol unless my care team says I may. I agree to give urine samples whenever asked. If I dont give a urine sample, the care team may stop my medicine.    8. If I enroll in the [place] Medical Marijuana program, I will tell my care team. I will also sign an agreement to share my medical records with my care team.    9. I will bring in my list of medicines (or my medicine bottles) each time I come to the clinic.   10. I will tell my care team right away if I become pregnant or have a new medical problem treated outside of my regular clinic.  11. I understand that this medicine can affect my  thinking and judgment. It may be unsafe for me to drive, use machinery and do dangerous tasks. I will not do any of these things until I know how the medicine affects me. If my dose changes, I will wait to see how it affects me. I will contact my care team if I have concerns about medicine side effects.    I understand that if I do not follow any of the conditions above, my prescriptions or treatment may be stopped.      I agree that my provider, clinic care team, and pharmacy may work with any city, state or federal law enforcement agency that investigates the misuse, sale, or other diversion of my controlled medicine. I will allow my provider to discuss my care with or share a copy of this agreement with any other treating provider, pharmacy or emergency room where I receive care. I agree to give up (waive) any right of privacy or confidentiality with respect to these consents.   I have read this agreement and have asked questions about anything I did not understand.    ___________________________________________________________________________  Patient signature - Date/Time  -Zacarias Dailey                                      ___________________________________________________________________________  Witness signature                                                                    ___________________________________________________________________________  Provider signature- Jeet López MD
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
## 2021-06-22 NOTE — PROGRESS NOTES
ASSESSMENT:  1. Essential hypertension  Blood pressure elevated.  Currently off medication.  Review of many many notes both in [facility] and locally suggest that he was taking lisinopril HCTZ and not amlodipine.  Will refill lisinopril HCTZ  - lisinopril-hydrochlorothiazide (PRINZIDE,ZESTORETIC) 20-25 mg per tablet; Take 1 tablet by mouth daily.  Dispense: 90 tablet; Refill: 3    2. Attention deficit hyperactivity disorder (ADHD), combined type  I am very concerned about his diagnosis and his history of methamphetamine abuse.  A psychology note reviewed from [facility] 2 years ago suggests an attempt at long-acting Adderall and an argument at that time.  The psychiatry note suggests that it is inappropriate to use short acting Adderall with his history of meth abuse.  I have difficulty finding the actual records    As a compromise, since he has been on these medications before, but reports that he has been off for a while, I will provide a prescription.  I made it very clear that he needed to perform the urine toxicology here in the office today before leaving but that he could defer the lab testing.  Within the next 1-2 months I also recommended a Pharm.D. review and psychology review.  He was agreeable    After the visit, I was informed by the laboratory that he left without obtaining the urine sample or having the blood drawn.  Pharmacy was contacted and Adderall prescription canceled until we obtain more information.  I would request that the Pharm.D. review the different pharmacies where he has had stimulant prescriptions and that a  be pulled for [place]  - Drugs of Abuse 1,Urine  - HM2(CBC w/o Differential)  - Comprehensive Metabolic Panel  - Thyroid Stimulating Hormone (TSH)  - Vitamin B12  - Vitamin D, Total (25-Hydroxy)  - Amb Referral to Medication Management  - Ambulatory referral to Psychology    3. Chronic bilateral low back pain with bilateral sciatica  Allowing naproxen as  needed  - naproxen (NAPROSYN) 500 MG tablet; Take 1 tablet (500 mg total) by mouth 2 (two) times a day as needed.  Dispense: 30 tablet; Refill: 3    4. Moderate episode of recurrent major depressive disorder (H)  After painful careful discussion he would like to take Paxil and reports that he has no longer been taking Viibryd, Zoloft, or Celexa.  He thinks Paxil gives him the least side effects    5. History of opioid abuse  Reviewed inpatient hospitalization and history of opioid abuse.  None recently  - Drugs of Abuse 1,Urine      PLAN:  Patient Instructions   Take Lisinopril with HCTZ 20/25 one pill each morning for blood pressure and water and salt    No Amlodipine    Paroxetine 20 mgs once a day for anxiety and depression    Refill adderall    In the next few months see:  Mann our psychologist downstairs  [facility] our pharmacist     Update blood and urine      Orders Placed This Encounter   Procedures     Drugs of Abuse 1,Urine     HM2(CBC w/o Differential)     Comprehensive Metabolic Panel     Thyroid Stimulating Hormone (TSH)     Vitamin B12     Vitamin D, Total (25-Hydroxy)     Amb Referral to Medication Management     Referral Priority:   Routine     Referral Type:   Health Education     Referral Reason:   Medication Management     Referral Location:   [facility]     Number of Visits Requested:   1     Ambulatory referral to Psychology     Referral Priority:   Routine     Referral Type:   Behavioral Health     Referral Reason:   Evaluation and Treatment     Number of Visits Requested:   1     Medications Discontinued During This Encounter   Medication Reason     dextroamphetamine-amphetamine (ADDERALL) 30 mg Tab Reorder     naproxen (NAPROSYN) 500 MG tablet Reorder       Return in about 3 months (around 4/7/2019).      CHIEF COMPLAINT:  Chief Complaint   Patient presents with     Medication Management       HISTORY OF PRESENT ILLNESS:  Zacarias is a 38 y.o. 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 He has been off of all of his medications for a while.     ADD: He has taken Adderall for ADD in the past and would like a new prescription for it. He has been out of the medication for some time. The first time he was started on stimulant medications was about ten years ago, and he has managed well on it for years. He thinks he has a combination of inattentiveness and hyperactivity. He noticed significant improvement in his ability to focus and accomplish tasks with Adderall. He is now struggling with his grades in online school and is not able to finish projects. He was seen by someone in [place] for evaluation of ADD about two years ago and has met with psychology in the past. He has not tolerated the extended release versions of Adderall in the past because they negatively impact his eating and sleeping habits. He states he is willing to leave urine tox today but would like to wait on blood. He understands a urine needs to be left as part of controlled substance agreement.     Anxiety/Depression: He has been prescribed sertraline, Viibryd, citalopram, and paroxetine in the past. He has taken these medications for anxiety and depression. The medications do help, but he has developed some side effects from them. He has a stutter at his baseline, but it worsens when on higher doses of the medication. He has been stressed lately and would like to go back on a medication. The side effects were the least noticeable with paroxetine, so that is the one he would like to restart at this point.     Hypertension: He would like a prescription for lisinopril 20 mg and HCTZ 25 mg. He recalls taking this combination for about a year. He recalls taking amlodipine in the past as well, but he has not been on it recently. The HCTZ made him urinate frequently when he first started it.     REVIEW OF SYSTEMS:   Believes he has blood work done in [place]. He takes  "gabapentin and naproxen as needed for back pain but not meloxicam.  All other systems are negative.    PFSH:  He moved to [place], [place] for a period of time, but he just recently moved back here in the beginning of December. He tries not to eat too much salt. He is currently job searching. He is enrolled in online college courses. He has children.     TOBACCO USE:  Social History     Tobacco Use   Smoking Status Never Smoker   Smokeless Tobacco Never Used       VITALS:  Vitals:    01/07/19 1559 01/07/19 1600   BP: 140/90 (!) 130/94   Patient Site: Left Arm Left Arm   Patient Position: Sitting Sitting   Cuff Size: Adult Regular Adult Regular   Pulse: (!) 102    Resp: 14    Weight: 185 lb (83.9 kg)    Height: 5' 5\" (1.651 m)      Wt Readings from Last 3 Encounters:   01/07/19 185 lb (83.9 kg)   01/06/17 177 lb 14.4 oz (80.7 kg)   11/16/16 178 lb (80.7 kg)     Body mass index is 30.79 kg/m .    PHYSICAL EXAM:  Constitutional:  Reveals nervous, distractible, inattentive, defensive man. Intermittent eye contact. No obvious tremor. No odor of alcohol. Inappropriately swearing to clinical staff. Vitals:  Per nursing notes.  Neurologic:  Cranial nerves II-XII intact.     Psychiatric:  Mood appropriate, memory intact.     QUALITY MEASURES:  The following are part of a depression follow up plan for the patient:  under care of mental health counselor    MEDICATIONS:  Current Outpatient Medications   Medication Sig Dispense Refill     acyclovir (ZOVIRAX) 400 MG tablet Take 400 mg by mouth 2 (two) times a day.       dextroamphetamine-amphetamine (ADDERALL) 30 mg Tab Take 30 mg by mouth 2 (two) times a day. 60 tablet 0     lisinopril (PRINIVIL,ZESTRIL) 20 MG tablet Take 20 mg by mouth daily.       meloxicam (MOBIC) 7.5 MG tablet Take 1 tablet (7.5 mg total) by mouth 2 (two) times a day. 60 tablet 3     sildenafil (VIAGRA) 100 MG tablet 1/2-1 tab as needed for erectile dysfunction 3 tablet 11     tadalafil (CIALIS) 20 MG " tablet Take 20 mg by mouth.       triamcinolone (KENALOG) 0.5 % cream Apply 1 application topically 2 (two) times a day as needed.       lisinopril-hydrochlorothiazide (PRINZIDE,ZESTORETIC) 20-25 mg per tablet Take 1 tablet by mouth daily. 90 tablet 3     naproxen (NAPROSYN) 500 MG tablet Take 1 tablet (500 mg total) by mouth 2 (two) times a day as needed. 30 tablet 3     No current facility-administered medications for this visit.        ADDITIONAL HISTORY SUMMARIZED (2): Reviewed Anila Damian notes from 2016 and 2017 regarding ADD. Reviewed 1/6/2017 Dr. Garcia note regarding ADD  DECISION TO OBTAIN EXTRA INFORMATION (1): Care Everywhere accessed.   RADIOLOGY TESTS (1): None.  LABS (1): Labs from 2016 reviewed. Labs ordered.   MEDICINE TESTS (1): None.  INDEPENDENT REVIEW (2 each): None.     The visit lasted a total of 33 minutes face to face with the patient. Over 50% of the time was spent counseling and educating the patient about his ADD, hypertension, and anxiety/depression.    I, Osiel German, am scribing for and in the presence of, Dr. López.    I, Dr. López, personally performed the services described in this documentation, as scribed by Osiel German in my presence, and it is both accurate and complete.    Total data points: 4

## 2021-06-22 NOTE — PROGRESS NOTES
Notified by lab patient did not show up at lab for UTOX, urine sample. Per Dr. López I called pharmacy to cancel Adderall, per violation of CSA.

## 2021-06-22 NOTE — TELEPHONE ENCOUNTER
Call from pt     Needs CII med refilled (Adderal)    Last OV noted 1/6/2017 - this was the date of the last Rx for it 30mg two times a day (IR) version.      Coordinated with scheduling to find appt for him with IM group as usual PCP not available for appt.      Chart review indicates that he received care infrequently and Adderal has been sporadically prescribed    Notes indicate he has requested only IR versions of the medication while clinicians have suggested ER capsules.  Pt has indicated the ER versions have not been effective  (see 12/29 and 12/30 Behavioral Health visits)     I did not verify his tele# with the initial intake call to Triage Line - encounters elsewhere indicate the tele#  on file is non-working         Talon Valladarse, RN   Triage and Medication Refills

## 2021-06-22 NOTE — PATIENT INSTRUCTIONS - HE
Take Lisinopril with HCTZ 20/25 one pill each morning for blood pressure and water and salt    No Amlodipine    Paroxetine 20 mgs once a day for anxiety and depression    Refill adderall    In the next few months see:  Mann our psychologist downstairs  [facility] our pharmacist     Update blood and urine

## 2021-06-23 NOTE — TELEPHONE ENCOUNTER
Spoke with Zacarias as he immediately called back after I left him a VM. I explained Dr López's concerns and he questions why he has to see a pharmacist here. He does have an appt with Mann in Feb. Explained no refills until seen and we have documentation of everything Dr THRASHER requested. Pt said he will have to call back.

## 2021-06-23 NOTE — TELEPHONE ENCOUNTER
Left mess to call me directly so I can relay the message from Dr López and discuss his repeated swearing at the visit this week.

## 2021-06-23 NOTE — TELEPHONE ENCOUNTER
Pt reports his urine screen is completed. He is wondering why Adderall has not been refilled.     Advised pt clinic is closed right now but issue will be addressed tomorrow.    Pt agrees to plan. Please contact pt as soon as possible and let him know status of refill request.     Reason for Disposition    Caller requesting a NON-URGENT new prescription or refill and triager unable to refill per unit policy    Protocols used: MEDICATION QUESTION CALL-A-AH

## 2021-06-23 NOTE — TELEPHONE ENCOUNTER
Medication Question or Clarification  Who is calling: Patient  What medication are you calling about?: Adderall 30 mg tab   What dose do you take?: 30 mg tab   How often are you taking the medication?: two times a day   Who prescribed the medication?: Dr. López   What is your question/concern?: patient is requesting transferred to [facility] pharmacy. Writer urieled script currently at [facility] pharmacy from 1/7/19. Please send electronically to [facility].  Pharmacy:[facility] pharmacy [place]  Okay to leave a detailed message?: Yes  Site CMT - Please call the pharmacy to obtain any additional needed information.
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
## 2021-06-27 ENCOUNTER — HEALTH MAINTENANCE LETTER (OUTPATIENT)
Age: 41
End: 2021-06-27

## 2021-08-04 ENCOUNTER — OFFICE VISIT (OUTPATIENT)
Dept: INTERNAL MEDICINE | Facility: CLINIC | Age: 41
End: 2021-08-04
Payer: COMMERCIAL

## 2021-08-04 VITALS
SYSTOLIC BLOOD PRESSURE: 142 MMHG | HEIGHT: 66 IN | WEIGHT: 190 LBS | DIASTOLIC BLOOD PRESSURE: 100 MMHG | BODY MASS INDEX: 30.53 KG/M2 | HEART RATE: 100 BPM | OXYGEN SATURATION: 97 %

## 2021-08-04 DIAGNOSIS — Z11.3 ROUTINE SCREENING FOR STI (SEXUALLY TRANSMITTED INFECTION): ICD-10-CM

## 2021-08-04 DIAGNOSIS — I10 ESSENTIAL HYPERTENSION: ICD-10-CM

## 2021-08-04 DIAGNOSIS — N52.9 ERECTILE DYSFUNCTION, UNSPECIFIED ERECTILE DYSFUNCTION TYPE: Primary | ICD-10-CM

## 2021-08-04 DIAGNOSIS — Z11.59 NEED FOR HEPATITIS C SCREENING TEST: ICD-10-CM

## 2021-08-04 DIAGNOSIS — F33.2 SEVERE EPISODE OF RECURRENT MAJOR DEPRESSIVE DISORDER, WITHOUT PSYCHOTIC FEATURES (H): ICD-10-CM

## 2021-08-04 DIAGNOSIS — N52.9 ERECTILE DYSFUNCTION, UNSPECIFIED ERECTILE DYSFUNCTION TYPE: ICD-10-CM

## 2021-08-04 DIAGNOSIS — F33.41 RECURRENT MAJOR DEPRESSIVE DISORDER, IN PARTIAL REMISSION (H): ICD-10-CM

## 2021-08-04 LAB — HIV 1+2 AB+HIV1 P24 AG SERPL QL IA: NEGATIVE

## 2021-08-04 PROCEDURE — 99214 OFFICE O/P EST MOD 30 MIN: CPT | Performed by: INTERNAL MEDICINE

## 2021-08-04 PROCEDURE — 36415 COLL VENOUS BLD VENIPUNCTURE: CPT | Performed by: INTERNAL MEDICINE

## 2021-08-04 PROCEDURE — 86780 TREPONEMA PALLIDUM: CPT | Performed by: INTERNAL MEDICINE

## 2021-08-04 PROCEDURE — 87389 HIV-1 AG W/HIV-1&-2 AB AG IA: CPT | Performed by: INTERNAL MEDICINE

## 2021-08-04 PROCEDURE — 86803 HEPATITIS C AB TEST: CPT | Performed by: INTERNAL MEDICINE

## 2021-08-04 RX ORDER — HYDROCHLOROTHIAZIDE 12.5 MG/1
12.5 TABLET ORAL DAILY
Qty: 30 TABLET | Refills: 0 | Status: SHIPPED | OUTPATIENT
Start: 2021-08-04 | End: 2022-02-16

## 2021-08-04 RX ORDER — SILDENAFIL 100 MG/1
TABLET, FILM COATED ORAL
Qty: 20 TABLET | Refills: 0 | Status: SHIPPED | OUTPATIENT
Start: 2021-08-04 | End: 2022-02-16

## 2021-08-04 RX ORDER — LISINOPRIL 20 MG/1
20 TABLET ORAL DAILY
Qty: 30 TABLET | Refills: 0 | Status: SHIPPED | OUTPATIENT
Start: 2021-08-04 | End: 2022-02-16

## 2021-08-04 RX ORDER — BUPROPION HYDROCHLORIDE 150 MG/1
150 TABLET ORAL EVERY MORNING
Qty: 30 TABLET | Refills: 0 | Status: SHIPPED | OUTPATIENT
Start: 2021-08-04 | End: 2022-05-24

## 2021-08-04 ASSESSMENT — MIFFLIN-ST. JEOR: SCORE: 1704.58

## 2021-08-04 NOTE — PROGRESS NOTES
Office Visit - Follow Up   Zacarias Dailey .   41 year old male    Date of Visit: 8/4/2021    Chief Complaint   Patient presents with     Clinic Care Coordination - Follow-up     discuss screening for STD;         Assessment and Plan   1. Erectile dysfunction, unspecified erectile dysfunction type  What he is describing to me sounds more like psychogenic erectile dysfunction.  Is a lot of stress and anxiety over all of this.  I think you be benefited by the [place] [facility].  We will also renew his Viagra.  - [facility]; Future    2. Routine screening for STI (sexually transmitted infection)  We will rescreen for STI.  I did discuss with him that I do not think that untreated STI is causing his symptoms.  - Neisseria gonorrhoeae PCR - Clinic Collect  - Chlamydia trachomatis PCR; Future  - HIV Antigen Antibody Combo; Future  - Treponema Abs w Reflex to RPR and Titer; Future    3. Essential hypertension  He needs better control of his blood pressure.  We will resume his medications and follow-up with Dr. Shelton soon.    4. Recurrent major depressive disorder, in partial remission (H)  Renew Wellbutrin and follow-up with Dr. Shelton.    5. Need for hepatitis C screening test    - Hepatitis C Screen Reflex to HCV RNA Quant and Genotype; Future        Return in about 4 weeks (around 9/1/2021) for Follow up, in person, with PCP only.     History of Present Illness   This 41 year old pleasant gentleman who has a history of hypertension and depression.  He is not been taking any of his medications.  He is concerned that he had untreated STD about a year and a half ago.  He believes this is what is causing his erectile dysfunction.  He tells me has been very stressed.  He is under a lot of worry about his erections.  Sometimes he will get sexually stimulated and then he loses erection.  Other times he will be in the middle of intercourse and will lose his erection right before orgasm.   "Its very worrisome to him.  He is worried what his girlfriend is going to think and what she does think.  He has a lot of stressors in his house and in his life he tells me.  He tells me his girlfriend thinks he should get back on his mood medications.  He is not taking any illicit substances.  Does not drink much alcohol at all.  He is not taking his blood pressure medications.  He is monogamous with his significant other and he is not having any symptoms of STI.    Review of Systems: A comprehensive review of systems was negative except as noted.     Medications, Allergies and Problem List   Reviewed, reconciled and updated  Post Discharge Medication Reconciliation Status:      Physical Exam   General Appearance:   Pleasant gentleman in no distress.    BP (!) 142/100 (BP Location: Left arm, Patient Position: Sitting, Cuff Size: Adult Small)   Pulse 100   Ht 1.676 m (5' 6\")   Wt 86.2 kg (190 lb)   SpO2 97%   BMI 30.67 kg/m           Additional Information   Current Outpatient Medications   Medication Sig Dispense Refill     acetaminophen (TYLENOL) 325 MG tablet [ACETAMINOPHEN (TYLENOL) 325 MG TABLET] Take 2 tablets (650 mg total) by mouth every 8 (eight) hours as needed for pain. (Patient not taking: Reported on 8/4/2021) 30 tablet 0     buPROPion (WELLBUTRIN XL) 150 MG 24 hr tablet [BUPROPION (WELLBUTRIN XL) 150 MG 24 HR TABLET] Take 1 tablet (150 mg total) by mouth every morning. (Patient not taking: Reported on 8/4/2021) 30 tablet 11     hydroCHLOROthiazide (HYDRODIURIL) 12.5 MG tablet [HYDROCHLOROTHIAZIDE (HYDRODIURIL) 12.5 MG TABLET] Take 1 tablet (12.5 mg total) by mouth daily. (Patient not taking: Reported on 8/4/2021) 90 tablet 1     lisinopriL (PRINIVIL,ZESTRIL) 20 MG tablet [LISINOPRIL (PRINIVIL,ZESTRIL) 20 MG TABLET] Take 1 tablet (20 mg total) by mouth daily. (Patient not taking: Reported on 8/4/2021) 90 tablet 1     sildenafiL (VIAGRA) 100 MG tablet [SILDENAFIL (VIAGRA) 100 MG TABLET] 1/2-1 tab as " needed for erectile dysfunction (Patient not taking: Reported on 8/4/2021) 20 tablet 3     tadalafil (CIALIS) 20 MG tablet [TADALAFIL (CIALIS) 20 MG TABLET] Take 20 mg by mouth. (Patient not taking: Reported on 8/4/2021)       No Known Allergies  Social History     Tobacco Use     Smoking status: Never Smoker     Smokeless tobacco: Never Used   Substance Use Topics     Alcohol use: None     Drug use: None       Review and/or order of clinical lab tests:  Review and/or order of radiology tests:  Review and/or order of medicine tests:  Discussion of test results with performing physician:  Decision to obtain old records and/or obtain history from someone other than the patient:  Review and summarization of old records and/or obtaining history from someone other than the patient and.or discussion of case with another health care provider:  Independent visualization of image, tracing or specimen itself:    Time:      JENIFFER HERRMANN MD

## 2021-08-05 LAB
HCV AB SERPL QL IA: NONREACTIVE
T PALLIDUM AB SER QL: NEGATIVE

## 2021-08-27 ENCOUNTER — OFFICE VISIT (OUTPATIENT)
Dept: FAMILY MEDICINE | Facility: CLINIC | Age: 41
End: 2021-08-27
Payer: COMMERCIAL

## 2021-08-27 VITALS
WEIGHT: 190.56 LBS | OXYGEN SATURATION: 99 % | RESPIRATION RATE: 10 BRPM | HEART RATE: 89 BPM | BODY MASS INDEX: 30.76 KG/M2 | TEMPERATURE: 98.3 F | SYSTOLIC BLOOD PRESSURE: 166 MMHG | DIASTOLIC BLOOD PRESSURE: 115 MMHG

## 2021-08-27 DIAGNOSIS — A54.9 GC (GONOCOCCUS INFECTION): ICD-10-CM

## 2021-08-27 DIAGNOSIS — B00.9 HSV (HERPES SIMPLEX VIRUS) INFECTION: Primary | ICD-10-CM

## 2021-08-27 DIAGNOSIS — A74.9 CHLAMYDIA INFECTION: ICD-10-CM

## 2021-08-27 PROCEDURE — 96372 THER/PROPH/DIAG INJ SC/IM: CPT | Performed by: EMERGENCY MEDICINE

## 2021-08-27 PROCEDURE — 99213 OFFICE O/P EST LOW 20 MIN: CPT | Mod: 25 | Performed by: EMERGENCY MEDICINE

## 2021-08-27 RX ORDER — DOXYCYCLINE 100 MG/1
100 TABLET ORAL 2 TIMES DAILY
Qty: 14 TABLET | Refills: 0 | Status: SHIPPED | OUTPATIENT
Start: 2021-08-27 | End: 2021-09-03

## 2021-08-27 RX ORDER — ACYCLOVIR 50 MG/G
OINTMENT TOPICAL
Qty: 15 G | Refills: 3 | Status: SHIPPED | OUTPATIENT
Start: 2021-08-27 | End: 2023-08-22

## 2021-08-27 RX ORDER — ACYCLOVIR 800 MG/1
800 TABLET ORAL EVERY 12 HOURS
Qty: 10 TABLET | Refills: 0 | Status: SHIPPED | OUTPATIENT
Start: 2021-08-27 | End: 2022-03-17

## 2021-08-27 NOTE — PATIENT INSTRUCTIONS
Patient Education     Chlamydia  Chlamydia is a very common sexually transmitted infection (STI). An STI is also called a sexually transmitted disease (STD). Most people don't have symptoms. Because of this, chlamydia may not be noticed until it's passed to someone else or it causes severe problems. Left untreated, this infection may make it hard or impossible for women and men to have children.       Symptoms  Many people with chlamydia have no symptoms. Women are more likely than men not to have symptoms.  If symptoms show up in women, they include:    Abnormal vaginal discharge    Bleeding between periods    Pain or burning during urination  If symptoms show up in men, they include:    Clear discharge (drip) from the penis    Pain or burning during urination    Rectal pain, discharge, or bleeding, especially in men who have sex with men  These symptoms usually disappear after a few weeks, with or without treatment. But if you are not treated, the chlamydia will still be present. It can cause long-term problems.  Potential problems  If the infection is not treated, it can lead to more serious health problems. In women, this can be pelvic inflammatory disease (PID). PID can make it hard or even impossible for a women to have a baby. It can also cause an ectopic (tubal) pregnancy. This type of pregnancy can't be carried to term. Symptoms of PID include fever, pain during sex, and pain in the belly. In men, an untreated chlamydia infection can damage the testes. This can cause pain and scarring. This can possibly affect the ability to have children. Chlamydia of the rectal area can cause serious damage. This includes infection and holes (fistulas).  Sexually active women and men should get checked for chlamydia regularly. This can help prevent PID.  Treatment  Chlamydia can be treated when found early. It can be cured with antibiotics. If you have it, tell your partner right away. Because people often don t have  symptoms, those diagnosed with chlamydia should ask their partners to get tested.  Prevention  Know your partner s history. Protect yourself by using a latex condom whenever you have sex. If you are pregnant, take extra care to get correct treatment. Pregnant women with untreated chlamydia can pass the infection on to the baby. This can cause eye, ear, or lung problems in the baby. There is also the risk of a premature delivery.  Resources  American Sexual Health Association [phone] www.[facility]asexualhealth.org/  CDC  411.334.6282  www.cdc.gov/std  [employer] last reviewed this educational content on 12/1/2018 2000-2021 [employer]. All rights reserved. This information is not intended as a substitute for professional medical care. Always follow your healthcare professional's instructions.           Patient Education     Gonorrhea  Gonorrhea is a bacterial infection that is transmitted sexually. Many women and some men who have gonorrhea don't have any signs or symptoms. If not treated, gonorrhea can cause a painful penile, vaginal, or rectal discharge. It can sometimes lead to swollen and painful joints or lifelong (permanent) damage to your reproductive organs. And in some cases it can make a man or woman unable to have children (infertile). If a pregnant woman has gonorrhea, she can infect her baby during childbirth.     Gonorrhea is also called the clap or the drip.   Symptoms  In men:    Pain or burning when urinating    Watery, milky, or yellow discharge from the penis or anus  In women:    Yellow or white discharge from the vagina or anus    Bleeding between periods    Treatment  Gonorrhea can be cured quickly with antibiotics. If you are being treated, your partner should also be checked by a healthcare provider. Don t have sex while you are being treated and for a week after.   Prevention  As with all sexually transmitted infections (STIs), knowing your partner s sexual history is important.  It's a key step in preventing gonorrhea. Also know the signs and symptoms of the infection. And use latex condoms to reduce your risk.   Resources  American Sexual Health Association STD Hotline, [phone], www.[employer] [facility].org   [facility], 451.118.2996, www.cdc.gov/std   [employer] last reviewed this educational content on 6/1/2019 2000-2021 [employer]. All rights reserved. This information is not intended as a substitute for professional medical care. Always follow your healthcare professional's instructions.           Patient Education     Herpes  If you have herpes, you re not alone. Millions of Americans have it. Herpes has no cure. But you can control it. And you can learn how to protect yourself and others from outbreaks.  What is herpes?  Herpes is a long-term (chronic) viral infection caused by the herpes simplex virus. There are 2 types of herpes simplex virus, type 1 (HSV-1) and type 2 (HSV-2). Infection with either type can cause sores and mild pain. You get herpes from contact with someone who carries the virus.  If sores occur on the lips, you have oral herpes. These are most often caused by HSV-1. Sores that occur on the finger (herpetic verito) are also often caused by HSV-1. If sores occur on the penis or in or around the vagina or rectum, you have genital herpes. This is most often caused by HSV-2.  Herpes outbreaks    The first outbreak of herpes sores is often the most severe. Then the white blood cells in the body s immune system make antibodies. These antibodies help kill the herpes virus. They may also help make future attacks less severe.    Some people have only 1 outbreak of sores. Some people have frequent outbreaks (every few weeks or months). Outbreaks of herpes sores often happen less often over time.    Herpes sores may appear without a cause. Outbreaks are more likely when the immune system is weak. Other viral infections (such as a cold) can cause outbreaks. Stress  from a poor diet, extreme tiredness (fatigue), or emotional upset can lead to outbreaks of sores. Exposure to strong sunlight often causes herpes sores to reappear.     To help prevent outbreaks    To prevent oral herpes outbreaks, don't get overexposed or wind, sun, or extreme temperatures. Use sunscreen and lip balm on affected areas.    If you are having outbreaks often, ask your healthcare provider about medicines that can help prevent outbreaks. Antiviral medicines are very effective.    How herpes spreads to others  Herpes can be spread during an outbreak. But even if you don't have sores, you can still infect others. You can take steps to prevent this. Antiviral medicines are very effective at preventing herpes from spreading to others.  To protect yourself and others    If you have an oral sore, don't kiss or have oral-genital contact.    If you have a genital sore, don't have intercourse or oral-genital contact.    Wash your hands after touching a sore.    Use a condom each time you have sex. You can pass the virus even when you don't have sores. If you re not sure about the timing of certain kinds of physical contact, ask your healthcare provider.    Tell any new partners that you have herpes.    If you re a woman, have Pap tests as often as your provider advises.    A woman can spread herpes to her  when giving birth, even without an active genital sore. If pregnant, tell your provider early in the pregnancy that you have herpes.     Daily antiviral medicine (acyclovir, famciclovir, or valacyclovir), in addition to consistent condom use, may reduce your chances of spreading herpes to an uninfected partner. Ask your healthcare provider if one of these medicines would be helpful for you.    Resources  American Sexual Health Association  [phone] www.[facility]xualhealth.org/  CDC, 118.485.1859, www.cdc.gov/std  [employer] last reviewed this educational content on 2019-2021 [employer]. All rights reserved. This information is not intended as a substitute for professional medical care. Always follow your healthcare professional's instructions.

## 2021-08-27 NOTE — PROGRESS NOTES
Impression:  1.  Genital herpes simplex outbreak 2.  History of chlamydia incompletely treated and continue to be exposed to his current sexual partner    Plan:  Rocephin 500 mg IM, doxycycline 100 mg twice a day for 7 days for chlamydia, acyclovir 800 mg twice a day for 5 days for HSV outbreak.  I do not think there is any need to retest him for GC or chlamydia since he is known to be positive and was not adequately treated in the past.  His sexual partner is also being evaluated and treated at another clinic.  He should avoid intercourse until the course of treatment is complete      Chief Complaint:  Patient presents with:  STD: Previously treated for STD, didnt finish meds, states symptoms are returning          HPI:   Zacarias Dailey Sr. is a 41 year old male who presents to this clinic for the evaluation of 2 concerns:    1.  He developed an outbreak of genital herpes on the shaft of his penis 3 days ago.  He has had herpes in the past and been treated with acyclovir but he has run out of acyclovir and did not have any to start.  The lesions are painful on the shaft of his penis and typical of his previous herpes outbreaks    2.  He also reports that he was diagnosed with chlamydia back in January.  He was given a shot of Rocephin and additional antibiotic but he did not get his outpatient prescription filled.  Also his sexual partner did not get treated at that time.  He has continued to have sex with her since then and so now he wants to go and be treated for gonorrhea and chlamydia.  He denies any penile discharge or dysuria.  Denies any rash or joint pain.  Other complaints      PMH:   Past Medical History:   Diagnosis Date     HTN (hypertension)      Low back pain      Opioid abuse (H)      Recurrent major depression (H)      Sciatica      No past surgical history on file.      ROS:  All other systems negative    Meds:    Current Outpatient Medications:      acetaminophen (TYLENOL) 325 MG tablet,  [ACETAMINOPHEN (TYLENOL) 325 MG TABLET] Take 2 tablets (650 mg total) by mouth every 8 (eight) hours as needed for pain., Disp: 30 tablet, Rfl: 0     buPROPion (WELLBUTRIN XL) 150 MG 24 hr tablet, Take 1 tablet (150 mg) by mouth every morning, Disp: 30 tablet, Rfl: 0     hydrochlorothiazide (HYDRODIURIL) 12.5 MG tablet, Take 1 tablet (12.5 mg) by mouth daily, Disp: 30 tablet, Rfl: 0     lisinopril (ZESTRIL) 20 MG tablet, Take 1 tablet (20 mg) by mouth daily, Disp: 30 tablet, Rfl: 0     sildenafil (VIAGRA) 100 MG tablet, [SILDENAFIL (VIAGRA) 100 MG TABLET] 1/2-1 tab as needed for erectile dysfunction, Disp: 20 tablet, Rfl: 0     tadalafil (CIALIS) 20 MG tablet, Take 20 mg by mouth , Disp: , Rfl:         Social:  Social History     Socioeconomic History     Marital status: Single     Spouse name: Not on file     Number of children: Not on file     Years of education: Not on file     Highest education level: Not on file   Occupational History     Not on file   Tobacco Use     Smoking status: Never Smoker     Smokeless tobacco: Never Used   Substance and Sexual Activity     Alcohol use: Not on file     Drug use: Not on file     Sexual activity: Yes     Partners: Female   Other Topics Concern     Not on file   Social History Narrative    Patient is currently in the process of divorce, she is a caregiver for his 2 kids 12 and 14-year-old, she is planning to move his mom from [place] to [place] so she can help out.  Is currently looking for a new job.  History of prior intermittent drug  abuse and amphetamine abuse.       Social Determinants of Health     Financial Resource Strain:      Difficulty of Paying Living Expenses:    Food Insecurity:      Worried About Running Out of Food in the Last Year:      Ran Out of Food in the Last Year:    Transportation Needs:      Lack of Transportation (Medical):      Lack of Transportation (Non-Medical):    Physical Activity:      Days of Exercise per Week:      Minutes of Exercise  per Session:    Stress:      Feeling of Stress :    Social Connections:      Frequency of Communication with Friends and Family:      Frequency of Social Gatherings with Friends and Family:      Attends Zoroastrian Services:      Active Member of Clubs or Organizations:      Attends Club or Organization Meetings:      Marital Status:    Intimate Partner Violence:      Fear of Current or Ex-Partner:      Emotionally Abused:      Physically Abused:      Sexually Abused:          Physical Exam:  Vitals:    08/27/21 1546   BP: (!) 166/115   BP Location: Right arm   Patient Position: Sitting   Cuff Size: Adult Regular   Pulse: 89   Resp: 10   Temp: 98.3  F (36.8  C)   TempSrc: Oral   SpO2: 99%   Weight: 86.4 kg (190 lb 9 oz)      Patient is awake, alert, no distress  Genital examination shows superficial erosions on the proximal shaft of the penis on erythematous base      Results:    No results found for this or any previous visit (from the past 24 hour(s)).           Michael Saez MD

## 2021-10-17 ENCOUNTER — HEALTH MAINTENANCE LETTER (OUTPATIENT)
Age: 41
End: 2021-10-17

## 2021-12-08 ENCOUNTER — HOSPITAL ENCOUNTER (EMERGENCY)
Facility: HOSPITAL | Age: 41
Discharge: HOME | End: 2021-12-08
Attending: EMERGENCY MEDICINE | Admitting: EMERGENCY MEDICINE
Payer: COMMERCIAL

## 2021-12-08 VITALS
DIASTOLIC BLOOD PRESSURE: 117 MMHG | OXYGEN SATURATION: 100 % | SYSTOLIC BLOOD PRESSURE: 208 MMHG | WEIGHT: 190 LBS | TEMPERATURE: 97.6 F | RESPIRATION RATE: 20 BRPM | BODY MASS INDEX: 30.67 KG/M2 | HEART RATE: 102 BPM

## 2021-12-08 DIAGNOSIS — L03.119 CELLULITIS AND ABSCESS OF LEG: ICD-10-CM

## 2021-12-08 DIAGNOSIS — L02.419 CELLULITIS AND ABSCESS OF LEG: ICD-10-CM

## 2021-12-08 PROCEDURE — 99283 EMERGENCY DEPT VISIT LOW MDM: CPT

## 2021-12-08 RX ORDER — SULFAMETHOXAZOLE/TRIMETHOPRIM 800-160 MG
1 TABLET ORAL 2 TIMES DAILY
Qty: 20 TABLET | Refills: 0 | Status: SHIPPED | OUTPATIENT
Start: 2021-12-08 | End: 2021-12-18

## 2021-12-08 ASSESSMENT — ENCOUNTER SYMPTOMS: FEVER: 0

## 2021-12-08 NOTE — ED TRIAGE NOTES
Patient developed a possible insect bite on right thigh. Patient reports that it was pruritic. Bite area turned into a blister or cyst, patient has been applying Neosporin and a Band-Aid. Patient reports it is warm to the tough.

## 2021-12-08 NOTE — ED PROVIDER NOTES
EMERGENCY DEPARTMENT ENCOUNTER      NAME: Zacarias Dailey Sr.  AGE: 41 year old male  YOB: 1980  MRN: [id number]  EVALUATION DATE & TIME: 12/8/2021  5:00 AM    PCP: Matias Garcia    ED PROVIDER: Bayron Trejo M.D.      Chief Complaint   Patient presents with     Blister         FINAL IMPRESSION:  1. Cellulitis and abscess of leg          ED COURSE & MEDICAL DECISION MAKING:    Pertinent Labs & Imaging studies reviewed. (See chart for details)  41 year old male presents to the Emergency Department for evaluation of sore on his right leg.  Does appear to be a draining abscess.  It is less than a centimeter.  I would not drain it here.  We will give Bactrim.  No systemic symptoms.  Discussed supportive care at home.  Will return for any worsening symptoms.    4:52 AM I met with the patient to gather history and to perform my initial exam. I discussed the plan for care while in the Emergency Department. PPE: Provider wore goggles and surgical mask.   5:04AM I discussed the plan for discharge with the patient, and patient is agreeable. We discussed supportive cares at home and reasons for return to the ER including new or worsening symptoms - all questions and concerns addressed. Patient to be discharged by RN.    At the conclusion of the encounter I discussed the results of all of the tests and the disposition. The questions were answered. The patient or family acknowledged understanding and was agreeable with the care plan.           MEDICATIONS GIVEN IN THE EMERGENCY:  Medications - No data to display    NEW PRESCRIPTIONS STARTED AT TODAY'S ER VISIT  Discharge Medication List as of 12/8/2021  5:04 AM      START taking these medications    Details   sulfamethoxazole-trimethoprim (BACTRIM DS) 800-160 MG tablet Take 1 tablet by mouth 2 times daily for 10 days, Disp-20 tablet, R-0, Local Print                =================================================================    HPI    Patient information  was obtained from: patient    Use of : N/A         Zacarias Dailey Sr. is a 41 year old male with no pertinent history who presents to this ED with fiance for evaluation of cyst.     Patient believes that he was bit by a bug 2-3 days ago and developed a small bump on right proximal thigh. The bump has gradually increased in size, comes to a point, appears white, and is hot to the touch. Area is painful. Unsure if it has been draining. Otherwise denies fever or any other complaints at this time.      REVIEW OF SYSTEMS   Review of Systems   Constitutional: Negative for fever.   Skin:        Positive for painful cyst (right proximal thigh).   All other systems reviewed and are negative.       PAST MEDICAL HISTORY:  Past Medical History:   Diagnosis Date     HTN (hypertension)      Low back pain      Opioid abuse (H)      Recurrent major depression (H)      Sciatica        PAST SURGICAL HISTORY:  History reviewed. No pertinent surgical history.        CURRENT MEDICATIONS:    No current facility-administered medications for this encounter.     Current Outpatient Medications   Medication     sulfamethoxazole-trimethoprim (BACTRIM DS) 800-160 MG tablet     acetaminophen (TYLENOL) 325 MG tablet     acyclovir (ZOVIRAX) 5 % external ointment     acyclovir (ZOVIRAX) 800 MG tablet     buPROPion (WELLBUTRIN XL) 150 MG 24 hr tablet     hydrochlorothiazide (HYDRODIURIL) 12.5 MG tablet     lisinopril (ZESTRIL) 20 MG tablet     sildenafil (VIAGRA) 100 MG tablet     tadalafil (CIALIS) 20 MG tablet         ALLERGIES:  No Known Allergies    FAMILY HISTORY:  History reviewed. No pertinent family history.    SOCIAL HISTORY:   Social History     Socioeconomic History     Marital status: Single     Spouse name: Not on file     Number of children: Not on file     Years of education: Not on file     Highest education level: Not on file   Occupational History     Not on file   Tobacco Use     Smoking status: Never Smoker      Smokeless tobacco: Never Used   Substance and Sexual Activity     Alcohol use: Not on file     Drug use: Not on file     Sexual activity: Yes     Partners: Female   Other Topics Concern     Not on file   Social History Narrative    Patient is currently in the process of divorce, she is a caregiver for his 2 kids 12 and 14-year-old, she is planning to move his mom from [place] to [place] so she can help out.  Is currently looking for a new job.  History of prior intermittent drug  abuse and amphetamine abuse.       Social Determinants of Health     Financial Resource Strain: Not on file   Food Insecurity: Not on file   Transportation Needs: Not on file   Physical Activity: Not on file   Stress: Not on file   Social Connections: Not on file   Intimate Partner Violence: Not on file   Housing Stability: Not on file       VITALS:  BP (!) 208/117   Pulse 102   Temp 97.6  F (36.4  C) (Temporal)   Resp 20   Wt 86.2 kg (190 lb)   SpO2 100%   BMI 30.67 kg/m      PHYSICAL EXAM    Physical Exam  Constitutional:       General: He is not in acute distress.     Appearance: He is well-developed. He is not diaphoretic.   HENT:      Head: Normocephalic and atraumatic.   Eyes:      General: No scleral icterus.     Pupils: Pupils are equal, round, and reactive to light.   Cardiovascular:      Rate and Rhythm: Regular rhythm.      Heart sounds: Normal heart sounds.   Pulmonary:      Effort: No respiratory distress.      Breath sounds: Normal breath sounds.   Chest:      Chest wall: No tenderness.   Abdominal:      Tenderness: There is no abdominal tenderness.   Musculoskeletal:         General: No tenderness. Normal range of motion.      Cervical back: Normal range of motion and neck supple. No tenderness.      Thoracic back: No tenderness.      Lumbar back: No tenderness.   Skin:     General: Skin is warm and dry.      Findings: No abrasion, laceration or rash.      Comments: Small 1 cm abscess with a central opening with  small drainage of pus over right upper thigh.  No streaking or erythema.   Neurological:      Mental Status: He is alert and oriented to person, place, and time.               I, Jane Cesar, am serving as a scribe to document services personally performed by Dr. Bayron Trejo, based on my observation and the provider's statements to me. I, Bayron Trejo MD attest that Jane Cesar is acting in a scribe capacity, has observed my performance of the services and has documented them in accordance with my direction.    Bayron Trejo M.D.  Emergency Medicine  [facility] EMERGENCY DEPARTMENT  [address] [place] [id number]  [phone]  Dept: [phone]     Bayron Trejo MD  12/08/21 0536

## 2022-03-17 DIAGNOSIS — B00.9 HSV (HERPES SIMPLEX VIRUS) INFECTION: ICD-10-CM

## 2022-03-17 RX ORDER — ACYCLOVIR 800 MG/1
800 TABLET ORAL EVERY 12 HOURS
Qty: 10 TABLET | Refills: 4 | Status: SHIPPED | OUTPATIENT
Start: 2022-03-17 | End: 2022-05-24

## 2022-03-17 NOTE — TELEPHONE ENCOUNTER
Refill Request  Medication name: Pending Prescriptions:                       Disp   Refills    acyclovir (ZOVIRAX) 800 MG tablet         10 tab*0            Sig: Take 1 tablet (800 mg) by mouth every 12 hours    Who prescribed the medication: PCP  Last refill on medication: 8/27/21  [facility]ed [facility]acy: [facility]  Last appointment with PCP: 8/27/21  Next appointment: Not due

## 2022-03-17 NOTE — TELEPHONE ENCOUNTER
"Routing refill request to provider for review/approval because:  Labs not current:  Cr    Last Written Prescription Date:  8/27/2021  Last Fill Quantity: 10,  # refills: 0     Last office visit provider:  8/27/2021     Requested Prescriptions   Pending Prescriptions Disp Refills     acyclovir (ZOVIRAX) 800 MG tablet 10 tablet 0     Sig: Take 1 tablet (800 mg) by mouth every 12 hours       Antivirals for Herpes Protocol Failed - 3/17/2022 11:12 AM        Failed - Normal serum creatinine on file in past 12 months     Recent Labs   Lab Test 08/04/20  1553   CR 1.51*       Ok to refill medication if creatinine is low          Passed - Patient is age 12 or older        Passed - Recent (12 mo) or future (30 days) visit within the authorizing provider's specialty     Patient has had an office visit with the authorizing provider or a provider within the authorizing providers department within the previous 12 mos or has a future within next 30 days. See \"Patient Info\" tab in inbasket, or \"Choose Columns\" in Meds & Orders section of the refill encounter.              Passed - Medication is active on med list             Leona Petty RN 03/17/22 1:23 PM  "

## 2022-04-23 DIAGNOSIS — N52.9 ERECTILE DYSFUNCTION, UNSPECIFIED ERECTILE DYSFUNCTION TYPE: ICD-10-CM

## 2022-04-26 RX ORDER — SILDENAFIL 100 MG/1
TABLET, FILM COATED ORAL
Qty: 60 TABLET | Refills: 0 | Status: SHIPPED | OUTPATIENT
Start: 2022-04-26 | End: 2022-05-24

## 2022-04-26 NOTE — TELEPHONE ENCOUNTER
"Last Written Prescription Date:  2/18/2022  Last Fill Quantity: 60,  # refills: 0   Last office visit provider:  8/4/2021     Requested Prescriptions   Pending Prescriptions Disp Refills     sildenafil (VIAGRA) 100 MG tablet [Pharmacy Med Name: SILDENAFIL CITRATE 100MG TABS] 60 tablet 0     Sig: TAKE 1/2 TO 1 TABLET AS NEEDED FOR ERECTILE DYSFUNCTION       Erectile Dysfuction Protocol Passed - 4/26/2022  5:29 PM        Passed - Absence of nitrates on medication list        Passed - Absence of Alpha Blockers on Med list        Passed - Recent (12 mo) or future (30 days) visit within the authorizing provider's specialty     Patient has had an office visit with the authorizing provider or a provider within the authorizing providers department within the previous 12 mos or has a future within next 30 days. See \"Patient Info\" tab in inbasket, or \"Choose Columns\" in Meds & Orders section of the refill encounter.              Passed - Medication is active on med list        Passed - Patient is age 18 or older             Ghazala Kelly RN 04/26/22 5:29 PM    "

## 2022-05-17 ENCOUNTER — APPOINTMENT (OUTPATIENT)
Dept: INTERNAL MEDICINE | Facility: CLINIC | Age: 42
End: 2022-05-17
Payer: COMMERCIAL

## 2022-05-24 ENCOUNTER — VIRTUAL VISIT (OUTPATIENT)
Dept: INTERNAL MEDICINE | Facility: CLINIC | Age: 42
End: 2022-05-24
Payer: COMMERCIAL

## 2022-05-24 DIAGNOSIS — I10 ESSENTIAL HYPERTENSION: Primary | ICD-10-CM

## 2022-05-24 DIAGNOSIS — N52.9 ERECTILE DYSFUNCTION, UNSPECIFIED ERECTILE DYSFUNCTION TYPE: ICD-10-CM

## 2022-05-24 DIAGNOSIS — B00.9 HSV (HERPES SIMPLEX VIRUS) INFECTION: ICD-10-CM

## 2022-05-24 DIAGNOSIS — Z51.81 ENCOUNTER FOR THERAPEUTIC DRUG MONITORING: ICD-10-CM

## 2022-05-24 DIAGNOSIS — F33.41 RECURRENT MAJOR DEPRESSIVE DISORDER, IN PARTIAL REMISSION (H): ICD-10-CM

## 2022-05-24 DIAGNOSIS — F98.8 ATTENTION DEFICIT DISORDER (ADD) WITHOUT HYPERACTIVITY: ICD-10-CM

## 2022-05-24 PROCEDURE — 99214 OFFICE O/P EST MOD 30 MIN: CPT | Mod: 95 | Performed by: INTERNAL MEDICINE

## 2022-05-24 RX ORDER — LISINOPRIL 20 MG/1
TABLET ORAL
Qty: 90 TABLET | Refills: 3 | Status: SHIPPED | OUTPATIENT
Start: 2022-05-24 | End: 2023-05-24

## 2022-05-24 RX ORDER — ACYCLOVIR 800 MG/1
800 TABLET ORAL EVERY 12 HOURS
Qty: 10 TABLET | Refills: 4 | Status: SHIPPED | OUTPATIENT
Start: 2022-05-24 | End: 2023-08-22

## 2022-05-24 RX ORDER — HYDROCHLOROTHIAZIDE 12.5 MG/1
TABLET ORAL
Qty: 90 TABLET | Refills: 3 | Status: SHIPPED | OUTPATIENT
Start: 2022-05-24 | End: 2023-05-24

## 2022-05-24 RX ORDER — SILDENAFIL 100 MG/1
TABLET, FILM COATED ORAL
Qty: 60 TABLET | Refills: 3 | Status: SHIPPED | OUTPATIENT
Start: 2022-05-24 | End: 2023-02-28

## 2022-05-24 NOTE — PROGRESS NOTES
Zacarias is a 42 year old who is being evaluated via a billable telephone visit.      What phone number would you like to be contacted at? [phone]  How would you like to obtain your AVS? Dyana    1. Essential hypertension  Zacarias reports a recent blood pressure 169/98.  He is on lisinopril and HCTZ.  He acknowledges that he had been erratic with taking it in the past but has been better recently.  He does not monitor home blood pressure.  He was advised to monitor blood pressure outside the clinic and schedule a clinic appointment for further evaluation  - hydrochlorothiazide (HYDRODIURIL) 12.5 MG tablet; TAKE ONE TABLET BY MOUTH FOR HYPERTENSION  Dispense: 90 tablet; Refill: 3  - lisinopril (ZESTRIL) 20 MG tablet; TAKE ONE TABLET BY MOUTH EVERY DAY FOR BLOOD PRESSURE CONTROL  Dispense: 90 tablet; Refill: 3    2. HSV (herpes simplex virus) infection  He requests a refill of acyclovir.  He uses this episodically for herpes outbreaks  - acyclovir (ZOVIRAX) 800 MG tablet; Take 1 tablet (800 mg) by mouth every 12 hours  Dispense: 10 tablet; Refill: 4    3. Erectile dysfunction, unspecified erectile dysfunction type  He requests a refill of sildenafil  - sildenafil (VIAGRA) 100 MG tablet; TAKE 1/2 TO 1 TABLET AS NEEDED FOR ERECTILE DYSFUNCTION  Dispense: 60 tablet; Refill: 3    4. Encounter for therapeutic drug monitoring  Urine tox screen is ordered.  He is interested in restarting Adderall.  Tox screen is required prior to this  - Urine Drugs of Abuse Screen Panel 1 - Drug Screen (Full); Future    5. Attention deficit disorder (ADD) without hyperactivity  He formerly was on Adderall immediate release 30 mg twice daily.  He states that he concentrates much better with this and denies adverse effects.  He does have a past history of narcotic and methamphetamine use.  He denies any use of illicit medications for many years.  He states he uses alcohol only on rare occasions.  He does not use marijuana or any other  drugs.    6. Recurrent major depressive disorder, in partial remission (H)  Has a past history of depression.  Son current currently is incarcerated which is a major stressor.  He most recently was on Wellbutrin.  He did not feel that Wellbutrin was of much benefit.  He also does not feel other antidepressants have been of sustained benefit.    Patient Instructions   1.  Check urine tox screen.    2.  Controlled substance agreement would need to be reviewed and signed.    3.  Adderall refill then could be considered assuming no abnormalities on urine tox screen.    4.  Schedule face-to-face appointment in clinic for follow-up of hypertension and to discuss other concerns.    5.  Refills for lisinopril, HCTZ, sildenafil, and acyclovir were sent to your pharmacy.    Solo Adames is a 42 year old who presents with concern about attention deficit.  He has used Adderall in the past with a most frequent dose of immediate release 30 mg twice daily.  States he has not used this over the past year.  He notes difficulty focusing and would like to restart it.  He reports that has helped greatly with concentration.  He denies insomnia or other adverse effects.    He most recently had been on Wellbutrin for depression.  He states that he felt poorly on this and discontinued it.  He notes some increased situational stress.  He reports his son is currently incarcerated.    He requests a refill of sildenafil.  He acknowledges difficulties with erections.    He is on lisinopril and HCTZ for hypertension.  He reports her recent blood pressure reading elevated to 169/98.  He does not have a home blood pressure cuff and does not check this on a regular basis.  He has been erratic with use of meds but has been better recently    HPI         Review of Systems   See above.  Review of systems is otherwise negative      Objective           Vitals:  No vitals were obtained today due to virtual visit.    Physical Exam   Alert talkative  man who is polite.  Affect is appropriate.  There is no dysarthria or aphasia.  Is no cough, wheezing or evidence for shortness of breath.  His significant other assisted with history            Phone call duration: 25 minutes

## 2022-05-24 NOTE — PATIENT INSTRUCTIONS
Check urine tox screen.    2.  Controlled substance agreement would need to be reviewed and signed.    3.  Adderall refill then could be considered assuming no abnormalities on urine tox screen.    4.  Schedule face-to-face appointment in clinic for follow-up of hypertension and to discuss other concerns.    5.  Refills for lisinopril, HCTZ, sildenafil, and acyclovir were sent to your pharmacy.

## 2022-07-24 ENCOUNTER — HEALTH MAINTENANCE LETTER (OUTPATIENT)
Age: 42
End: 2022-07-24

## 2022-08-10 ENCOUNTER — TELEPHONE (OUTPATIENT)
Dept: INTERNAL MEDICINE | Facility: CLINIC | Age: 42
End: 2022-08-10

## 2022-08-10 NOTE — TELEPHONE ENCOUNTER
General Call      Reason for Call:  Pt stating his medication was in a car and it was towed away -  Needing new rx's sent to pharmacy for:  Hyrochlorthiazide  Sildenafil    Pharm:  [facility] [address]    What are your questions or concerns:  n/a    Date of last appointment with provider: n/a    Could we send this information to you in [place] or would you prefer to receive a phone call?:   Patient would prefer a phone call   Okay to leave a detailed message?: Yes at Other phone number:  [phone]

## 2022-08-12 NOTE — TELEPHONE ENCOUNTER
Spoke with patient to gather more information. Patient states that he does not need any refills because he found his medications. States he thought they were in the car but turned out they were at home.

## 2022-10-02 ENCOUNTER — HEALTH MAINTENANCE LETTER (OUTPATIENT)
Age: 42
End: 2022-10-02

## 2022-11-16 ENCOUNTER — TELEPHONE (OUTPATIENT)
Dept: INTERNAL MEDICINE | Facility: CLINIC | Age: 42
End: 2022-11-16

## 2022-11-16 DIAGNOSIS — M79.661 PAIN IN BOTH LOWER LEGS: Primary | ICD-10-CM

## 2022-11-16 DIAGNOSIS — M79.662 PAIN IN BOTH LOWER LEGS: Primary | ICD-10-CM

## 2022-11-16 RX ORDER — IBUPROFEN 600 MG/1
600 TABLET, FILM COATED ORAL EVERY 8 HOURS PRN
Qty: 40 TABLET | Refills: 0 | Status: SHIPPED | OUTPATIENT
Start: 2022-11-16

## 2022-11-16 NOTE — TELEPHONE ENCOUNTER
"Attempted to contact patient to relay information below from Dr Jacobson. No answer at either number listed. One number is disconnected and the other number gives the message \"Call rejected.\" Unable to leave message to call clinic or relay any information to patient.    Debbi Jacobson MD  You 6 hours ago (11:25 AM)     TC  So, I sent him a prescription for ibuprofen 600 mg 3 times daily.  I looked at his kidney function which was off a smidge.  Can you let him know the prescription was sent, however, he needs to take the medication with food and he needs to drink 64 ounces of liquid a day.  Looks like he has erythema nodosum.  You could offer him a follow-up appointment with Dr. Shelton upon his return or someone else.        "

## 2022-11-16 NOTE — TELEPHONE ENCOUNTER
"Patient presented in person to the clinic demanding to be seen.  staff called up to this RN to come talk to the patient. When I arrived at the  the patient was charging his laptop sitting in a wheelchair.    Writer spoke with patient who explains that he was seen in the ED yesterday and \"they did not help me.\" Chart review indicates he was instructed to take ibuprofen for his bilateral leg pain. Patient states he has not done this because he does not have any money to purchase OTC ibuprofen and requests a prescription be sent to his pharmacy.     During this encounter patient is becoming increasingly frustrated that \"no one is doing anything to help him.\" Writer explained that this clinic does not offer walk-in services and he would need an appointment to be seen by any provider. At the time of writing this there are only virtual appointments available, which were offered to the patient but he refused. Patient made a phone call and then stated that if there is nothing we can do at the clinic he will just leave.    Patient requests assistance getting to his car. Writer obliged and pushed the wheelchair to the patient's car. Upon standing patient yelled out in pain and swore at this RN. Stated \"this is the pain I am talking about.\" Patient got in his car and drove away.  "

## 2023-02-25 ENCOUNTER — NURSE TRIAGE (OUTPATIENT)
Dept: NURSING | Facility: CLINIC | Age: 43
End: 2023-02-25
Payer: COMMERCIAL

## 2023-02-25 NOTE — TELEPHONE ENCOUNTER
Pt calling that he is a new diabetic and had been treated for sores on his legs, those are now gone however his legs still hurt.  Pt states he is needing another round of antibiotic.  Pt requesting OV appt to assess BP and diabetic medications.  Pt transferred to  to schedule appt.  Sharon Monk RN  [facility] Nurse Advisor    Additional Information    Negative: New-onset or worsening symptoms, see that guideline (e.g., diarrhea, runny nose, sore throat)    Negative: Medicine question not related to refill or renewal    Negative: Caller (e.g., patient or pharmacist) requesting information about a new medicine    Negative: Caller requesting information unrelated to medicine    Negative: [1] Prescription refill request for ESSENTIAL medicine (i.e., likelihood of harm to patient if not taken) AND [2] triager unable to refill per department policy    Negative: [1] Prescription not at pharmacy AND [2] was prescribed by PCP recently  (Exception: triager has access to EMR and prescription is recorded there. Go to Home Care and confirm for pharmacy.)    Negative: [1] Pharmacy calling with prescription questions AND [2] triager unable to answer question    Negative: Prescription request for new medicine (not a refill)    Negative: Caller requesting a CONTROLLED substance prescription refill (e.g., narcotics, ADHD medicines)    Negative: [1] Prescription refill request for NON-ESSENTIAL medicine (i.e., no harm to patient if med not taken) AND [2] triager unable to refill per department policy    Negative: [1] Caller has NON-URGENT medicine question about med that PCP prescribed AND [2] triager unable to answer question    Negative: [1] Prescription prescribed recently is not at pharmacy AND [2] triager has access to patient's EMR AND [3] prescription is recorded in the EMR    Negative: [1] Caller requesting a prescription renewal (no refills left), no triage required, AND [2] triager able to renew prescription per  department policy    Negative: Patient has refills remaining on their prescription    Negative: Caller with prescription and triager answers question    Negative: Pharmacy with prescription refill question and triager answers question    Protocols used: MEDICATION REFILL AND RENEWAL CALL-A-AH

## 2023-02-28 DIAGNOSIS — N52.9 ERECTILE DYSFUNCTION, UNSPECIFIED ERECTILE DYSFUNCTION TYPE: ICD-10-CM

## 2023-03-01 RX ORDER — SILDENAFIL 100 MG/1
TABLET, FILM COATED ORAL
Qty: 60 TABLET | Refills: 0 | Status: SHIPPED | OUTPATIENT
Start: 2023-03-01 | End: 2023-05-24

## 2023-04-03 DIAGNOSIS — E11.69 TYPE 2 DIABETES MELLITUS WITH OTHER SPECIFIED COMPLICATION, UNSPECIFIED WHETHER LONG TERM INSULIN USE (H): Primary | ICD-10-CM

## 2023-04-03 RX ORDER — BLOOD-GLUCOSE METER
EACH MISCELLANEOUS
COMMUNITY
Start: 2022-11-10 | End: 2023-04-03

## 2023-04-03 NOTE — TELEPHONE ENCOUNTER
"Routing refill request to provider for review/approval because:  Medication is reported/historical  Patient needs to be seen    Last Written Prescription Date:  4/3/2023  Last Fill Quantity: NA,  # refills: NA   Last office visit provider:  3/30/2023     Requested Prescriptions   Pending Prescriptions Disp Refills     Blood Glucose Monitoring Suppl (ACCU-CHEK GUIDE ME) w/Device KIT         Diabetic Supplies Protocol Failed - 4/3/2023 12:31 PM        Failed - Recent (6 mo) or future (30 days) visit within the authorizing provider's specialty     Patient had office visit in the last 6 months or has a visit in the next 30 days with authorizing provider.  See \"Patient Info\" tab in inbasket, or \"Choose Columns\" in Meds & Orders section of the refill encounter.            Passed - Medication is active on med list        Passed - Patient is 18 years of age or older         Signed Prescriptions Disp Refills    Blood Glucose Monitoring Suppl (ACCU-CHEK GUIDE ME) w/Device KIT       Sig: USE TO TEST BLOOD SUGAR DAILY       There is no refill protocol information for this order          Leona Sky RN 04/03/23 4:31 PM  "

## 2023-04-03 NOTE — CONFIDENTIAL NOTE
Reviewed care everywhere.  Glycosylated hemoglobin 10.6 at the [facility] on November 4, 2022    Unclear who his regular doctor is and who is managing diabetes    Unclear how often he checks his sugars    Please check with him, check with pharmacy, fill out prescription to match what he is doing and route back for my signature

## 2023-04-05 RX ORDER — BLOOD-GLUCOSE METER
1 EACH MISCELLANEOUS 2 TIMES DAILY
Qty: 1 KIT | Refills: 0 | Status: SHIPPED | OUTPATIENT
Start: 2023-04-05

## 2023-04-05 NOTE — TELEPHONE ENCOUNTER
Attempted to contact patient to clarify prescription. Home number no longer in service, left message on mobile number to call clinic.    Spoke with pharmacy who states the patient's last prescriptions came from a Dr Singleton. Chart reviewed and notes from 11/4/22 hospital admission indicate patient is supposed to be testing sugars twice per day.

## 2023-05-20 ENCOUNTER — HEALTH MAINTENANCE LETTER (OUTPATIENT)
Age: 43
End: 2023-05-20

## 2023-05-24 ENCOUNTER — OFFICE VISIT (OUTPATIENT)
Dept: FAMILY MEDICINE | Facility: CLINIC | Age: 43
End: 2023-05-24
Payer: COMMERCIAL

## 2023-05-24 VITALS
WEIGHT: 183 LBS | DIASTOLIC BLOOD PRESSURE: 114 MMHG | TEMPERATURE: 98.6 F | RESPIRATION RATE: 20 BRPM | SYSTOLIC BLOOD PRESSURE: 178 MMHG | OXYGEN SATURATION: 96 % | BODY MASS INDEX: 29.41 KG/M2 | HEIGHT: 66 IN | HEART RATE: 105 BPM

## 2023-05-24 DIAGNOSIS — N52.9 ERECTILE DYSFUNCTION, UNSPECIFIED ERECTILE DYSFUNCTION TYPE: ICD-10-CM

## 2023-05-24 DIAGNOSIS — I10 ESSENTIAL HYPERTENSION: ICD-10-CM

## 2023-05-24 DIAGNOSIS — F33.41 RECURRENT MAJOR DEPRESSIVE DISORDER, IN PARTIAL REMISSION (H): ICD-10-CM

## 2023-05-24 DIAGNOSIS — F19.20 CHEMICAL DEPENDENCY (H): ICD-10-CM

## 2023-05-24 DIAGNOSIS — E11.65 TYPE 2 DIABETES MELLITUS WITH HYPERGLYCEMIA, WITHOUT LONG-TERM CURRENT USE OF INSULIN (H): ICD-10-CM

## 2023-05-24 DIAGNOSIS — E11.69 TYPE 2 DIABETES MELLITUS WITH OTHER SPECIFIED COMPLICATION, UNSPECIFIED WHETHER LONG TERM INSULIN USE (H): ICD-10-CM

## 2023-05-24 PROBLEM — E11.9 TYPE 2 DIABETES MELLITUS (H): Status: ACTIVE | Noted: 2022-11-04

## 2023-05-24 PROBLEM — F11.11 HISTORY OF OPIOID ABUSE (H): Status: RESOLVED | Noted: 2019-01-07 | Resolved: 2023-05-24

## 2023-05-24 PROBLEM — E11.9 TYPE 2 DIABETES MELLITUS (H): Status: RESOLVED | Noted: 2022-11-04 | Resolved: 2023-05-24

## 2023-05-24 PROCEDURE — 99204 OFFICE O/P NEW MOD 45 MIN: CPT | Mod: GC | Performed by: STUDENT IN AN ORGANIZED HEALTH CARE EDUCATION/TRAINING PROGRAM

## 2023-05-24 RX ORDER — METFORMIN HCL 500 MG
500 TABLET, EXTENDED RELEASE 24 HR ORAL
Qty: 90 TABLET | Refills: 0 | Status: SHIPPED | OUTPATIENT
Start: 2023-05-24 | End: 2023-08-22

## 2023-05-24 RX ORDER — LISINOPRIL 20 MG/1
TABLET ORAL
Qty: 30 TABLET | Refills: 1 | Status: SHIPPED | OUTPATIENT
Start: 2023-05-24 | End: 2023-08-22

## 2023-05-24 RX ORDER — GABAPENTIN 100 MG/1
100 CAPSULE ORAL 3 TIMES DAILY
Qty: 30 CAPSULE | Refills: 0 | Status: SHIPPED | OUTPATIENT
Start: 2023-05-24

## 2023-05-24 RX ORDER — HYDROCHLOROTHIAZIDE 25 MG/1
12.5 TABLET ORAL
Qty: 30 TABLET | Refills: 1 | Status: SHIPPED | OUTPATIENT
Start: 2023-05-24 | End: 2023-06-19

## 2023-05-24 RX ORDER — HYDROCHLOROTHIAZIDE 25 MG/1
1 TABLET ORAL
COMMUNITY
Start: 2022-11-10 | End: 2023-05-24

## 2023-05-24 RX ORDER — SILDENAFIL 100 MG/1
TABLET, FILM COATED ORAL
Qty: 60 TABLET | Refills: 0 | Status: SHIPPED | OUTPATIENT
Start: 2023-05-24 | End: 2023-08-22

## 2023-05-24 NOTE — PROGRESS NOTES
Assessment & Plan     Type 2 diabetes mellitus with other specified complication, unspecified whether long term insulin use (H)  Type 2 diabetes mellitus with hyperglycemia, without long-term current use of insulin (H)  A1c last checked in Nov 2022 was 10.6, per chart review and patient history, only on 25 U lantus BID--though he only took daily. Continues to check sugars infrequently--generally 300-500s. Would like to resume therapies, plan to start on metformin and follow up closely. Lab work ordered--prefers to return for lab only visit tomorrow. Foot exam wnl. Has some peripheral numbness/tingling in hands--will trial small amount of gabapentin PRN--would like to wean as able with sugar control. Will likely need injectable agent--would likely benefit from GLP-1 over insulins.   - Hemoglobin A1c  - Lipid Profile  - metFORMIN (GLUCOPHAGE XR) 500 MG 24 hr tablet  Dispense: 90 tablet; Refill: 0  - gabapentin (NEURONTIN) 100 MG capsule  Dispense: 30 capsule; Refill: 0  - Hemoglobin A1c  - Lipid Profile  - ROUTINE FOOT CARE BY A PHYSICIAN OF A DIABETIC PATIENT WITH DIABETIC SENSORY    Essential hypertension  BP elevated today 178/114. Previously on hydrochlorothiazide and lisinopril, resuming today. BMP ordered, will follow in 2 weeks.  - BASIC METABOLIC PANEL  - lisinopril (ZESTRIL) 20 MG tablet  Dispense: 30 tablet; Refill: 1  - hydrochlorothiazide (HYDRODIURIL) 25 MG tablet  Dispense: 30 tablet; Refill: 1  - BASIC METABOLIC PANEL    Chemical dependency (H)  History of methamphetamine use. Reports last use was maybe months ago, [facility] recent ingestion of methamphetamine in early May. Will continue to follow and support sobriety. Interested in establishing with a care manager--will continue to discuss at the next visit.    Recurrent major depressive disorder, in partial remission (H)  States mood is improved, denies need for therapy/medications at this time.    Erectile dysfunction,  "unspecified erectile dysfunction type  Requests refill for viagra. Will continue to support HTN and T2DM management.   - sildenafil (VIAGRA) 100 MG tablet  Dispense: 60 tablet; Refill: 0      Return in about 2 weeks (around 6/7/2023).     Precepted with Dr. Jhaveri.    Deidra Tompkins MD  [facility] Adames is a 43 year old, presenting for the following health issues:  Refill Request (For Gabapentin)      HPI     Here for refills  Last in ED in early May for ingestion  Has history of substance use.  Thinks his last use was a few months ago; mainly methamphetamine.     Diagnosed with diabetes last year  Has not been to doctor since then.    Dr. Garcia retired.  Wanting to establish care here.    Nov 2022 A1c was 10.6  Was on insulin lantus 25 U BID, only took daily; not on currently, ran out a few weeks ago.       Review of Systems   Constitutional, HEENT, cardiovascular, pulmonary, gi and gu systems are negative, except as otherwise noted.      Objective    BP (!) 178/114   Pulse 105   Temp 98.6  F (37  C)   Resp 20   Ht 1.676 m (5' 6\")   Wt 83 kg (183 lb)   SpO2 96%   BMI 29.54 kg/m    Body mass index is 29.54 kg/m .  Physical Exam   GENERAL: healthy, alert and no distress  MS: no gross musculoskeletal defects noted, no edema  Diabetic foot exam: normal DP and PT pulses, no trophic changes or ulcerative lesions, normal sensory exam and normal monofilament exam    Due for A1c, lipid, BMP, will return for lab visit tomorrow.    ----- Service Performed and Documented by Resident or Fellow ------            " “You can access the FollowHealth Patient Portal, offered by [facility], by registering with the following website: [email or web address]”

## 2023-05-24 NOTE — PROGRESS NOTES
Preceptor Attestation:   Patient seen, evaluated and discussed with the resident. I have verified the content of the note, which accurately reflects my assessment of the patient and the plan of care.    Supervising Physician:Ghazala Jhaveri MD    [facility]

## 2023-06-15 DIAGNOSIS — I10 ESSENTIAL HYPERTENSION: ICD-10-CM

## 2023-06-19 RX ORDER — HYDROCHLOROTHIAZIDE 25 MG/1
12.5 TABLET ORAL
Qty: 30 TABLET | Refills: 0 | Status: SHIPPED | OUTPATIENT
Start: 2023-06-19 | End: 2023-08-22

## 2023-06-19 NOTE — TELEPHONE ENCOUNTER
Patient requested to make follow up appointment for future refills. Due for lab work as this was deferred at the last visit.

## 2023-08-12 ENCOUNTER — HEALTH MAINTENANCE LETTER (OUTPATIENT)
Age: 43
End: 2023-08-12

## 2023-08-22 ENCOUNTER — OFFICE VISIT (OUTPATIENT)
Dept: FAMILY MEDICINE | Facility: CLINIC | Age: 43
End: 2023-08-22
Payer: COMMERCIAL

## 2023-08-22 VITALS
BODY MASS INDEX: 31.46 KG/M2 | HEART RATE: 104 BPM | DIASTOLIC BLOOD PRESSURE: 122 MMHG | OXYGEN SATURATION: 99 % | SYSTOLIC BLOOD PRESSURE: 166 MMHG | WEIGHT: 188.8 LBS | TEMPERATURE: 97.7 F | HEIGHT: 65 IN

## 2023-08-22 DIAGNOSIS — E11.65 TYPE 2 DIABETES MELLITUS WITH HYPERGLYCEMIA, WITHOUT LONG-TERM CURRENT USE OF INSULIN (H): Primary | ICD-10-CM

## 2023-08-22 DIAGNOSIS — N52.9 ERECTILE DYSFUNCTION, UNSPECIFIED ERECTILE DYSFUNCTION TYPE: ICD-10-CM

## 2023-08-22 DIAGNOSIS — E78.5 HYPERLIPIDEMIA LDL GOAL <100: ICD-10-CM

## 2023-08-22 DIAGNOSIS — F19.20 CHEMICAL DEPENDENCY (H): ICD-10-CM

## 2023-08-22 DIAGNOSIS — I10 ESSENTIAL HYPERTENSION: ICD-10-CM

## 2023-08-22 LAB — HBA1C MFR BLD: 8.6 % (ref 0–5.6)

## 2023-08-22 PROCEDURE — 36415 COLL VENOUS BLD VENIPUNCTURE: CPT | Performed by: STUDENT IN AN ORGANIZED HEALTH CARE EDUCATION/TRAINING PROGRAM

## 2023-08-22 PROCEDURE — 80048 BASIC METABOLIC PNL TOTAL CA: CPT | Performed by: STUDENT IN AN ORGANIZED HEALTH CARE EDUCATION/TRAINING PROGRAM

## 2023-08-22 PROCEDURE — 83036 HEMOGLOBIN GLYCOSYLATED A1C: CPT | Performed by: STUDENT IN AN ORGANIZED HEALTH CARE EDUCATION/TRAINING PROGRAM

## 2023-08-22 PROCEDURE — 99214 OFFICE O/P EST MOD 30 MIN: CPT | Mod: GC

## 2023-08-22 PROCEDURE — 80061 LIPID PANEL: CPT | Performed by: STUDENT IN AN ORGANIZED HEALTH CARE EDUCATION/TRAINING PROGRAM

## 2023-08-22 RX ORDER — LISINOPRIL 10 MG/1
10 TABLET ORAL DAILY
Qty: 30 TABLET | Refills: 0 | Status: SHIPPED | OUTPATIENT
Start: 2023-08-22 | End: 2023-09-18

## 2023-08-22 RX ORDER — LISINOPRIL 20 MG/1
TABLET ORAL
Qty: 30 TABLET | Refills: 1 | Status: SHIPPED | OUTPATIENT
Start: 2023-08-22 | End: 2023-08-22

## 2023-08-22 RX ORDER — METFORMIN HCL 500 MG
2000 TABLET, EXTENDED RELEASE 24 HR ORAL
Qty: 90 TABLET | Refills: 0 | Status: SHIPPED | OUTPATIENT
Start: 2023-08-22

## 2023-08-22 RX ORDER — HYDROCHLOROTHIAZIDE 25 MG/1
12.5 TABLET ORAL
Qty: 30 TABLET | Refills: 0 | Status: SHIPPED | OUTPATIENT
Start: 2023-08-22

## 2023-08-22 RX ORDER — SILDENAFIL 100 MG/1
TABLET, FILM COATED ORAL
Qty: 60 TABLET | Refills: 0 | Status: SHIPPED | OUTPATIENT
Start: 2023-08-22 | End: 2024-06-26

## 2023-08-22 ASSESSMENT — PATIENT HEALTH QUESTIONNAIRE - PHQ9: SUM OF ALL RESPONSES TO PHQ QUESTIONS 1-9: 1

## 2023-08-22 NOTE — PROGRESS NOTES
Assessment & Plan     Type 2 diabetes mellitus with hyperglycemia, without long-term current use of insulin (H)  Patient newer to our clinic. Last visit 5/24. Has not been seen since that time. A1c today of 8.6, above goal of 7, (attempted to call patient to discuss results but no answer). Previous A1c from [place] was 10.6. Increased metformin dose to 2g and plan to start GLP-1. Discussed letting us know if insurance issues with Ozempic as we can try another one. Previously on Lantus but given recent A1c likely does not need insulin at this time.   - Albumin Random Urine Quantitative with Creat Ratio; Future  - AMB Adult Diabetes Educator Referral; Future  - metFORMIN (GLUCOPHAGE XR) 500 MG 24 hr tablet; Take 4 tablets (2,000 mg) by mouth daily (with dinner)  - semaglutide (OZEMPIC) 2 MG/3ML pen; Inject 0.25 mg Subcutaneous every 7 days  - Adult Eye  Referral; Future  - up to date on foot exam per last visit     Essential hypertension  Patient with uncontrolled blood pressure. Asymptomatic at this time. Had previously been on hydrochlorothiazide 25 mg and lisinopril 20 mg.  But he has not taken these for months.  So we will restart lisinopril at a lower does to not create PATRICIO.  BMP today showing creatinine of 1.45 -- looks to be baseline? No diagnosed CKD but it appears he may have it.  Does not appear he has a urine albumin recently.   - hydrochlorothiazide (HYDRODIURIL) 25 MG tablet; Take 0.5 tablets (12.5 mg) by mouth daily at 2 pm  - lisinopril (ZESTRIL) 10 MG tablet; Take 1 tablet (10 mg) by mouth daily  - follow-up at next appointment for urine microalbumin (unable to obtain today as unable to urinate)-- already on an ACE  - BMP at follow-up given resumption of meds      Erectile dysfunction, unspecified erectile dysfunction type  Intermittent use of viagra. Needing refill today. Discussed controlling above will hopefully improve this as well.   - sildenafil (VIAGRA) 100 MG tablet; TAKE 1/2 TO 1  TABLET AS NEEDED FOR ERECTILE DYSFUNCTION  - continued control of DM and hypertension     Chemical dependency  Previous frequent meth use. Declines use (of any substance) today -- asked as speech appeared quite pressured with hypertension and tachycardia. States now only a couple times a year. Discussed substance use worsening hypertension if using.  -continued substance use discussion at subsequent visits    Hyperlipidemia:  The 10-year ASCVD risk score (Nia PEREZ, et al., 2019) is: 19%    Values used to calculate the score:      Age: 43 years      Sex: Male      Is Non- : Yes      Diabetic: Yes      Tobacco smoker: No      Systolic Blood Pressure: 166 mmHg      Is BP treated: Yes      HDL Cholesterol: 50 mg/dL      Total Cholesterol: 202 mg/dL  --Attempted to call patient to discuss results but no answer. Will follow-up at subsequent visit to discuss statin initiation        Mariana Heaton MD  [facility] Adames is a 43 year old, presenting for the following health issues:  Diabetes (Metformin not working), Hypertension (F/u), and Refill Request      HPI   DM follow-up:   last visit 5/24 --at that time sugars 300-400, started on metformin 500mg XR and told to follow-up -- lost to follow-up  -checking sugars at home? Yes 200-400s   -metformin 500mg daily  -Recent A1c: 10.6  -last year notes being on Lantus BID 25U but only taking daily per last visit note. Foot exam normal at last visit.    Hypertension:   -on hydrochlorothiazide 25mg and lisinorpil 20mg: not taking  -checking at home? No  -limited caffeine intake  -declines any substance use. Previous meth use and now uses a couple times a year  -smoking? no  -history of ADHD      Review of Systems   Constitutional, HEENT, cardiovascular, pulmonary, gi and gu systems are negative, except as otherwise noted.      Objective    BP (!) 166/122   Pulse 104   Temp 97.7  F (36.5  C)   Ht 1.662 m (5'  "5.43\")   Wt 85.6 kg (188 lb 12.8 oz)   SpO2 99%   BMI 31.00 kg/m    Body mass index is 31 kg/m .  Physical Exam   GENERAL: Healthy, alert and no distress  EYES: Eyes grossly normal to inspection.  No discharge or erythema, or obvious scleral/conjunctival abnormalities.  RESP:  Lungs clear throughout. No wheeze or crackles.   CV: Heart RRR. No murmur  Abdomen: Soft, nontender, nondistended.  MSK: No gross deformity. Normal tone.  SKIN: Visible skin clear. No significant rash, abnormal pigmentation or lesions.  NEURO: Cranial nerves grossly intact.  Mentation and speech appropriate for age.  PSYCH: Pressured speech          "

## 2023-08-23 LAB
ANION GAP SERPL CALCULATED.3IONS-SCNC: 13 MMOL/L (ref 7–15)
BUN SERPL-MCNC: 12 MG/DL (ref 6–20)
CALCIUM SERPL-MCNC: 9.5 MG/DL (ref 8.6–10)
CHLORIDE SERPL-SCNC: 99 MMOL/L (ref 98–107)
CHOLEST SERPL-MCNC: 202 MG/DL
CREAT SERPL-MCNC: 1.45 MG/DL (ref 0.67–1.17)
DEPRECATED HCO3 PLAS-SCNC: 26 MMOL/L (ref 22–29)
GFR SERPL CREATININE-BSD FRML MDRD: 61 ML/MIN/1.73M2
GLUCOSE SERPL-MCNC: 209 MG/DL (ref 70–99)
HDLC SERPL-MCNC: 50 MG/DL
LDLC SERPL CALC-MCNC: 127 MG/DL
NONHDLC SERPL-MCNC: 152 MG/DL
POTASSIUM SERPL-SCNC: 4.1 MMOL/L (ref 3.4–5.3)
SODIUM SERPL-SCNC: 138 MMOL/L (ref 136–145)
TRIGL SERPL-MCNC: 123 MG/DL

## 2023-08-28 NOTE — PROGRESS NOTES
Preceptor Attestation:  Patient's case reviewed and discussed with the resident, Mariana Heaton MD, and I personally evaluated the patient. I agree with written assessment and plan of care.    Supervising Physician:  Louisa Pineda MD   [facility]

## 2023-09-18 DIAGNOSIS — I10 ESSENTIAL HYPERTENSION: ICD-10-CM

## 2023-09-20 RX ORDER — LISINOPRIL 10 MG/1
10 TABLET ORAL DAILY
Qty: 30 TABLET | Refills: 3 | Status: SHIPPED | OUTPATIENT
Start: 2023-09-20

## 2023-11-28 ENCOUNTER — PATIENT OUTREACH (OUTPATIENT)
Dept: CARE COORDINATION | Facility: CLINIC | Age: 43
End: 2023-11-28
Payer: COMMERCIAL

## 2023-11-28 NOTE — PROGRESS NOTES
Clinic Care Coordination Contact  Follow Up Progress Note      Assessment: The pt was recently in the ED, I called to check up on the pt and help the pt setup a ED follow up. I called the pt, but his phone was not available.    Care Gaps:    Health Maintenance Due   Topic Date Due    YEARLY PREVENTIVE VISIT  Never done    MICROALBUMIN  Never done    ADVANCE CARE PLANNING  Never done    DEPRESSION ACTION PLAN  Never done    EYE EXAM  Never done    COVID-19 Vaccine (1) Never done    Pneumococcal Vaccine: Pediatrics (0 to 5 Years) and At-Risk Patients (6 to 64 Years) (1 - PCV) Never done    HEPATITIS A IMMUNIZATION (1 of 2 - Risk 2-dose series) Never done    DTAP/TDAP/TD IMMUNIZATION (2 - Td or Tdap) 10/22/2018    INFLUENZA VACCINE (1) 09/01/2023    A1C  11/22/2023           Care Plans      Intervention/Education provided during outreach:               Plan:     Care Coordinator will follow up in

## 2023-11-29 ENCOUNTER — PATIENT OUTREACH (OUTPATIENT)
Dept: CARE COORDINATION | Facility: CLINIC | Age: 43
End: 2023-11-29
Payer: COMMERCIAL

## 2023-11-29 NOTE — PROGRESS NOTES
Clinic Care Coordination Contact  Follow Up Progress Note      Assessment: The pt was recently in the ED, I called to check up on the pt and help the pt setup a ED follow up. I called the pt, but her phone was not available.     Care Gaps:    Health Maintenance Due   Topic Date Due    YEARLY PREVENTIVE VISIT  Never done    MICROALBUMIN  Never done    ADVANCE CARE PLANNING  Never done    DEPRESSION ACTION PLAN  Never done    EYE EXAM  Never done    COVID-19 Vaccine (1) Never done    Pneumococcal Vaccine: Pediatrics (0 to 5 Years) and At-Risk Patients (6 to 64 Years) (1 - PCV) Never done    HEPATITIS A IMMUNIZATION (1 of 2 - Risk 2-dose series) Never done    DTAP/TDAP/TD IMMUNIZATION (2 - Td or Tdap) 10/22/2018    INFLUENZA VACCINE (1) 09/01/2023    A1C  11/22/2023           Care Plans      Intervention/Education provided during outreach:               Plan:     Care Coordinator will follow up in

## 2023-11-30 ENCOUNTER — PATIENT OUTREACH (OUTPATIENT)
Dept: CARE COORDINATION | Facility: CLINIC | Age: 43
End: 2023-11-30
Payer: COMMERCIAL

## 2023-12-30 ENCOUNTER — HEALTH MAINTENANCE LETTER (OUTPATIENT)
Age: 43
End: 2023-12-30

## 2024-05-18 ENCOUNTER — HEALTH MAINTENANCE LETTER (OUTPATIENT)
Age: 44
End: 2024-05-18

## 2024-06-26 DIAGNOSIS — N52.9 ERECTILE DYSFUNCTION, UNSPECIFIED ERECTILE DYSFUNCTION TYPE: ICD-10-CM

## 2024-06-27 RX ORDER — SILDENAFIL 100 MG/1
TABLET, FILM COATED ORAL
Qty: 60 TABLET | Refills: 0 | Status: SHIPPED | OUTPATIENT
Start: 2024-06-27

## 2024-08-22 ENCOUNTER — PATIENT OUTREACH (OUTPATIENT)
Dept: CARE COORDINATION | Facility: CLINIC | Age: 44
End: 2024-08-22
Payer: COMMERCIAL

## 2024-08-22 NOTE — PROGRESS NOTES
Clinic Care Coordination Contact  Follow Up Progress Note      Assessment:  The pt was recently in the ED, I called to check up on the pt and help the pt setup a ED follow up. I called the pt, but got his vm, so I left a vm for the pt to give me a call back.     Care Gaps:    Health Maintenance Due   Topic Date Due    YEARLY PREVENTIVE VISIT  Never done    MICROALBUMIN  Never done    ADVANCE CARE PLANNING  Never done    DEPRESSION ACTION PLAN  Never done    EYE EXAM  Never done    Pneumococcal Vaccine: Pediatrics (0 to 5 Years) and At-Risk Patients (6 to 64 Years) (1 of 2 - PCV) Never done    COLORECTAL CANCER SCREENING  Never done    HEPATITIS A IMMUNIZATION (1 of 2 - Risk 2-dose series) Never done    DTAP/TDAP/TD IMMUNIZATION (2 - Td or Tdap) 10/22/2018    COVID-19 Vaccine (1 - 2023-24 season) Never done    A1C  11/22/2023    PHQ-9  02/22/2024    DIABETIC FOOT EXAM  05/24/2024    BMP  08/22/2024    LIPID  08/22/2024

## 2024-08-23 ENCOUNTER — PATIENT OUTREACH (OUTPATIENT)
Dept: CARE COORDINATION | Facility: CLINIC | Age: 44
End: 2024-08-23
Payer: COMMERCIAL

## 2024-08-26 ENCOUNTER — PATIENT OUTREACH (OUTPATIENT)
Dept: CARE COORDINATION | Facility: CLINIC | Age: 44
End: 2024-08-26
Payer: COMMERCIAL

## 2024-10-05 ENCOUNTER — HEALTH MAINTENANCE LETTER (OUTPATIENT)
Age: 44
End: 2024-10-05

## 2024-10-24 ENCOUNTER — TELEPHONE (OUTPATIENT)
Dept: FAMILY MEDICINE | Facility: CLINIC | Age: 44
End: 2024-10-24

## 2024-10-24 ENCOUNTER — OFFICE VISIT (OUTPATIENT)
Dept: INTERNAL MEDICINE | Facility: CLINIC | Age: 44
End: 2024-10-24
Payer: COMMERCIAL

## 2024-10-24 ENCOUNTER — ORDERS ONLY (AUTO-RELEASED) (OUTPATIENT)
Dept: INTERNAL MEDICINE | Facility: CLINIC | Age: 44
End: 2024-10-24

## 2024-10-24 VITALS
TEMPERATURE: 97.7 F | WEIGHT: 175.3 LBS | SYSTOLIC BLOOD PRESSURE: 162 MMHG | RESPIRATION RATE: 14 BRPM | DIASTOLIC BLOOD PRESSURE: 94 MMHG | BODY MASS INDEX: 29.2 KG/M2 | OXYGEN SATURATION: 97 % | HEIGHT: 65 IN | HEART RATE: 97 BPM

## 2024-10-24 DIAGNOSIS — F33.41 RECURRENT MAJOR DEPRESSIVE DISORDER, IN PARTIAL REMISSION (H): ICD-10-CM

## 2024-10-24 DIAGNOSIS — Z85.038 ENCOUNTER FOR FOLLOW-UP SURVEILLANCE OF COLON CANCER: ICD-10-CM

## 2024-10-24 DIAGNOSIS — F19.20 CHEMICAL DEPENDENCY (H): ICD-10-CM

## 2024-10-24 DIAGNOSIS — I10 ESSENTIAL HYPERTENSION: ICD-10-CM

## 2024-10-24 DIAGNOSIS — Z08 ENCOUNTER FOR FOLLOW-UP SURVEILLANCE OF COLON CANCER: ICD-10-CM

## 2024-10-24 DIAGNOSIS — E11.69 TYPE 2 DIABETES MELLITUS WITH OTHER SPECIFIED COMPLICATION, UNSPECIFIED WHETHER LONG TERM INSULIN USE (H): ICD-10-CM

## 2024-10-24 DIAGNOSIS — E11.65 TYPE 2 DIABETES MELLITUS WITH HYPERGLYCEMIA, WITHOUT LONG-TERM CURRENT USE OF INSULIN (H): ICD-10-CM

## 2024-10-24 DIAGNOSIS — N52.9 ERECTILE DYSFUNCTION, UNSPECIFIED ERECTILE DYSFUNCTION TYPE: ICD-10-CM

## 2024-10-24 DIAGNOSIS — I10 HYPERTENSION, UNSPECIFIED TYPE: Primary | ICD-10-CM

## 2024-10-24 DIAGNOSIS — Z12.11 SCREEN FOR COLON CANCER: ICD-10-CM

## 2024-10-24 PROBLEM — E11.9 DIABETES MELLITUS, TYPE 2 (H): Status: ACTIVE | Noted: 2024-10-24

## 2024-10-24 PROCEDURE — 99214 OFFICE O/P EST MOD 30 MIN: CPT | Performed by: INTERNAL MEDICINE

## 2024-10-24 RX ORDER — LANCETS
EACH MISCELLANEOUS
Qty: 100 EACH | Refills: 6 | Status: SHIPPED | OUTPATIENT
Start: 2024-10-24

## 2024-10-24 RX ORDER — LISINOPRIL 40 MG/1
TABLET ORAL
COMMUNITY
Start: 2024-04-05 | End: 2024-10-24

## 2024-10-24 RX ORDER — ATORVASTATIN CALCIUM 20 MG/1
1 TABLET, FILM COATED ORAL AT BEDTIME
COMMUNITY
Start: 2024-03-12

## 2024-10-24 RX ORDER — AMLODIPINE BESYLATE 5 MG/1
5 TABLET ORAL DAILY
COMMUNITY
Start: 2024-03-12 | End: 2024-10-24

## 2024-10-24 RX ORDER — KETOROLAC TROMETHAMINE 30 MG/ML
1 INJECTION, SOLUTION INTRAMUSCULAR; INTRAVENOUS ONCE
Qty: 1 EACH | Refills: 0 | Status: SHIPPED | OUTPATIENT
Start: 2024-10-24 | End: 2024-10-24

## 2024-10-24 RX ORDER — METFORMIN HYDROCHLORIDE 500 MG/1
2000 TABLET, EXTENDED RELEASE ORAL
Qty: 90 TABLET | Refills: 0 | Status: CANCELLED | OUTPATIENT
Start: 2024-10-24

## 2024-10-24 RX ORDER — VALACYCLOVIR HYDROCHLORIDE 500 MG/1
1 TABLET, FILM COATED ORAL DAILY
COMMUNITY
Start: 2024-03-12

## 2024-10-24 RX ORDER — INSULIN GLARGINE 100 [IU]/ML
20 INJECTION, SOLUTION SUBCUTANEOUS AT BEDTIME
Qty: 15 ML | Refills: 2 | Status: SHIPPED | OUTPATIENT
Start: 2024-10-24

## 2024-10-24 RX ORDER — SILDENAFIL 100 MG/1
TABLET, FILM COATED ORAL
Qty: 60 TABLET | Refills: 0 | Status: SHIPPED | OUTPATIENT
Start: 2024-10-24

## 2024-10-24 RX ORDER — LISINOPRIL 10 MG/1
10 TABLET ORAL DAILY
Qty: 30 TABLET | Refills: 3 | Status: CANCELLED | OUTPATIENT
Start: 2024-10-24

## 2024-10-24 RX ORDER — ACYCLOVIR 400 MG/1
400 TABLET ORAL EVERY 12 HOURS
Qty: 180 TABLET | Refills: 3 | Status: SHIPPED | OUTPATIENT
Start: 2024-10-24

## 2024-10-24 RX ORDER — HYDROCHLOROTHIAZIDE 12.5 MG/1
CAPSULE ORAL
Qty: 6 EACH | Refills: 3 | Status: SHIPPED | OUTPATIENT
Start: 2024-10-24

## 2024-10-24 RX ORDER — HYDROCHLOROTHIAZIDE 25 MG/1
12.5 TABLET ORAL
Qty: 30 TABLET | Refills: 0 | Status: CANCELLED | OUTPATIENT
Start: 2024-10-24

## 2024-10-24 RX ORDER — GABAPENTIN 100 MG/1
100 CAPSULE ORAL 3 TIMES DAILY
Qty: 30 CAPSULE | Refills: 0 | Status: SHIPPED | OUTPATIENT
Start: 2024-10-24 | End: 2024-10-31

## 2024-10-24 RX ORDER — AMLODIPINE BESYLATE 10 MG/1
10 TABLET ORAL DAILY
Qty: 90 TABLET | Refills: 2 | Status: SHIPPED | OUTPATIENT
Start: 2024-10-24

## 2024-10-24 RX ORDER — INSULIN GLARGINE 100 [IU]/ML
14 INJECTION, SOLUTION SUBCUTANEOUS
COMMUNITY
Start: 2024-03-12 | End: 2024-10-24

## 2024-10-24 ASSESSMENT — ANXIETY QUESTIONNAIRES
1. FEELING NERVOUS, ANXIOUS, OR ON EDGE: MORE THAN HALF THE DAYS
5. BEING SO RESTLESS THAT IT IS HARD TO SIT STILL: NEARLY EVERY DAY
8. IF YOU CHECKED OFF ANY PROBLEMS, HOW DIFFICULT HAVE THESE MADE IT FOR YOU TO DO YOUR WORK, TAKE CARE OF THINGS AT HOME, OR GET ALONG WITH OTHER PEOPLE?: VERY DIFFICULT
IF YOU CHECKED OFF ANY PROBLEMS ON THIS QUESTIONNAIRE, HOW DIFFICULT HAVE THESE PROBLEMS MADE IT FOR YOU TO DO YOUR WORK, TAKE CARE OF THINGS AT HOME, OR GET ALONG WITH OTHER PEOPLE: VERY DIFFICULT
2. NOT BEING ABLE TO STOP OR CONTROL WORRYING: NEARLY EVERY DAY
6. BECOMING EASILY ANNOYED OR IRRITABLE: NEARLY EVERY DAY
7. FEELING AFRAID AS IF SOMETHING AWFUL MIGHT HAPPEN: NEARLY EVERY DAY
GAD7 TOTAL SCORE: 20
GAD7 TOTAL SCORE: 20
4. TROUBLE RELAXING: NEARLY EVERY DAY
7. FEELING AFRAID AS IF SOMETHING AWFUL MIGHT HAPPEN: NEARLY EVERY DAY
GAD7 TOTAL SCORE: 20
3. WORRYING TOO MUCH ABOUT DIFFERENT THINGS: NEARLY EVERY DAY

## 2024-10-24 ASSESSMENT — PAIN SCALES - GENERAL: PAINLEVEL_OUTOF10: MODERATE PAIN (4)

## 2024-10-24 ASSESSMENT — PATIENT HEALTH QUESTIONNAIRE - PHQ9
10. IF YOU CHECKED OFF ANY PROBLEMS, HOW DIFFICULT HAVE THESE PROBLEMS MADE IT FOR YOU TO DO YOUR WORK, TAKE CARE OF THINGS AT HOME, OR GET ALONG WITH OTHER PEOPLE: SOMEWHAT DIFFICULT
SUM OF ALL RESPONSES TO PHQ QUESTIONS 1-9: 6
SUM OF ALL RESPONSES TO PHQ QUESTIONS 1-9: 6

## 2024-10-24 NOTE — TELEPHONE ENCOUNTER
Please start prior authorization:     Disp Refills Start End KUMAR   Continuous Glucose Sensor (FREESTYLE ADAM 3 PLUS SENSOR) MISC 6 each 3 10/24/2024 -- No   Sig: Use 1 sensor every 15 days. Use to read blood sugars per 's instructions.   Sent to pharmacy as: FreeStyle Adam 3 Plus Sensor   Class: E-Prescribe   Order: [id number]   E-Prescribing Status: Receipt confirmed by pharmacy (10/24/2024  1:24 PM CDT)     Pharmacy    [facility] #[id number] - [place], [place] - [address] [place] [address]     Associated Diagnoses    Type 2 diabetes mellitus with hyperglycemia, without long-term current use of insulin (H) [E11.65]          Prescribing Provider's NPI: [id number]  Lizz Zambrano

## 2024-10-24 NOTE — PATIENT INSTRUCTIONS
Please start following medications :  Lantus 20 units a day at bedtrime   Short acting novolog before meals   Sliding scale If BS is more than 200 take 5 units  More than 300 take 10 units  More than 400 take 15 units and call nurse  3.  blood pressure medication amlopdipin  4. Start cholesterol pill

## 2024-10-24 NOTE — PROGRESS NOTES
Assessment & Plan     Erectile dysfunction, unspecified erectile dysfunction type    - sildenafil (VIAGRA) 100 MG tablet; TAKE 1/2 TO 1 TABLET AS NEEDED FOR ERECTILE DYSFUNCTION    Type 2 diabetes mellitus with hyperglycemia, without long-term current use of insulin (H)  Lab Results   Component Value Date    A1C 8.6 08/22/2023     Since he has not been taking his medicines it is more likely to be uncontrolled.  Will resume his Lantus at the the prior dose that he was taking he would need 20 units I would say and also NovoLog with a sliding scale.  He is a good candidate for a CGM.  He does seem a little confused about the different types of glucometers.  For immediate use he should use the fingerstick so that was ordered.  He does know how to do that.  It is unclear what he has used for his blood pressure will give amlodipine.  - HEMOGLOBIN A1C; Future  - Albumin Random Urine Quantitative with Creat Ratio; Future  - OPTOMETRY REFERRAL; Future  - gabapentin (NEURONTIN) 100 MG capsule; Take 1 capsule (100 mg) by mouth 3 times daily.  - Continuous Glucose  (FREESTYLE LEXI 3 READER) JOAQUIN; 1 each once for 1 dose. Use to read blood sugars per 's instructions.  - Continuous Glucose Sensor (FREESTYLE LEXI 3 PLUS SENSOR) MISC; Use 1 sensor every 15 days. Use to read blood sugars per 's instructions.  - blood glucose monitoring (NO BRAND SPECIFIED) meter device kit; Use to test blood sugar three  times daily or as directed. Preferred blood glucose meter OR supplies to accompany: Blood Glucose Monitor Brands: per insurance.  - blood glucose (NO BRAND SPECIFIED) test strip; Use to test blood sugar 3 times daily or as directed. To accompany: Blood Glucose Monitor Brands: per insurance.  - thin (NO BRAND SPECIFIED) lancets; Use with lanceting device. To accompany: Blood Glucose Monitor Brands: per insurance.    Essential hypertension  Suboptimal control he has been without medications start  "amlodipine  He should also start a statin.  Type 2 diabetes mellitus with other specified complication, unspecified whether long term insulin use (H)    - gabapentin (NEURONTIN) 100 MG capsule; Take 1 capsule (100 mg) by mouth 3 times daily.  - Glutamic acid decarboxylase antibody; Future  - LANTUS SOLOSTAR 100 UNIT/ML soln; Inject 20 Units subcutaneously at bedtime.  - insulin aspart (NOVOLOG PEN) 100 UNIT/ML pen; Sliding scale If BS is more than 200 take 5 units  More than 300 take 10 units  More than 400 take 15 units and call nurse  - Comprehensive metabolic panel; Future  - Lipid panel reflex to direct LDL Fasting; Future  - acyclovir (ZOVIRAX) 400 MG tablet; Take 1 tablet (400 mg) by mouth every 12 hours.  - Adult Diabetes Education  Referral; Future  - TSH; Future  - CBC with platelets and differential; Future  - Urine Drug Screen; Future    Hypertension, unspecified type    - amLODIPine (NORVASC) 10 MG tablet; Take 1 tablet (10 mg) by mouth daily.    Screen for colon cancer  Recommend he get screening for colonoscopy.    Chemical dependency (H)  Currently sober    Recurrent major depressive disorder, in partial remission (H)  Clinically doing    Encounter for follow-up surveillance of colon cancer  He is willing to do a Cologuard test  - COLOGUARD([employer]); Future          BMI  Estimated body mass index is 29.17 kg/m  as calculated from the following:    Height as of this encounter: 1.651 m (5' 5\").    Weight as of this encounter: 79.5 kg (175 lb 4.8 oz).             Solo Adames is a 44 year old, presenting for the following health issues:  Pleasant man with a history of substance abuse primarily meth addiction who was recently incarcerated is now currently homeless on discharge has had a diagnosis of diabetes on insulin since last year.  Strong family history of diabetes he was on insulin throughout with dosing given through the system but has been without medications for 2 months he " has had no hyper glycemic ketoacidosis or coma.  Was urinating a lot a year ago and went to see a doctor and was hyperlguemic   And drve himself to the hospital   2 months out of meds   During Incarceration was given meds   Was homeless   Break up with partner   Living with friend is currently   Diabetes      10/24/2024    12:56 PM   Additional Questions   Roomed by roseanne bazzi     History of Present Illness       Mental Health Follow-up:  Patient presents to follow-up on Depression & Anxiety.Patient's depression since last visit has been:  Medium  The patient is not having other symptoms associated with depression.  Patient's anxiety since last visit has been:  Medium  The patient is not having other symptoms associated with anxiety.  Any significant life events: relationship concerns, job concerns, financial concerns, housing concerns, grief or loss and health concerns  Patient is not feeling anxious or having panic attacks.  Patient has no concerns about alcohol or drug use.    Diabetes:   He presents for follow up of diabetes.  He is checking home blood glucose a few times a month.   He checks blood glucose after meals.  Blood glucose is sometimes over 200 and sometimes under 70. He is aware of hypoglycemia symptoms including shakiness, dizziness, weakness, blurred vision and confusion.   He is concerned about blood sugar frequently over 200.   He is having excessive thirst and blurry vision.  The patient has not had a diabetic eye exam in the last 12 months.          Hypertension: He presents for follow up of hypertension.  He does not check blood pressure  regularly outside of the clinic. Outside blood pressures have been over 140/90. He does not follow a low salt diet.     He eats 0-1 servings of fruits and vegetables daily.He consumes 3 sweetened beverage(s) daily.He exercises with enough effort to increase his heart rate 10 to 19 minutes per day.  He exercises with enough effort to increase his heart rate 3 or  "less days per week. He is missing 7 dose(s) of medications per week.  He is not taking prescribed medications regularly due to other.                     Objective    BP (!) 162/94 (BP Location: Left arm, Patient Position: Sitting, Cuff Size: Adult Regular)   Pulse 97   Temp 97.7  F (36.5  C)   Resp 14   Ht 1.651 m (5' 5\")   Wt 79.5 kg (175 lb 4.8 oz)   SpO2 97%   BMI 29.17 kg/m    Body mass index is 29.17 kg/m .  Physical Exam   GENERAL: alert and no distress  NECK: no adenopathy, no asymmetry, masses, or scars  RESP: lungs clear to auscultation - no rales, rhonchi or wheezes  CV: regular rate and rhythm, normal S1 S2, no S3 or S4, no murmur, click or rub, no peripheral edema  MS: no gross musculoskeletal defects noted, no edema            Signed Electronically by: Lizz Zambrano MD    "

## 2024-10-24 NOTE — TELEPHONE ENCOUNTER
Please start prior authorization:     Disp Refills Start End KUMAR   sildenafil (VIAGRA) 100 MG tablet 60 tablet 0 10/24/2024 -- No   Sig: TAKE 1/2 TO 1 TABLET AS NEEDED FOR ERECTILE DYSFUNCTION   Sent to pharmacy as: Sildenafil Citrate 100 MG Oral Tablet (VIAGRA)   Class: E-Prescribe   Order: [id number]   E-Prescribing Status: Receipt confirmed by pharmacy (10/24/2024  1:35 PM CDT)     Pharmacy    [facility] #[id number] - [place], [place] - [address] [place] [address] AT [facility]     Associated Diagnoses    Erectile dysfunction, unspecified erectile dysfunction type [N52.9]            Prescribing Provider's NPI: [id number]  Lizz Zambrano

## 2024-10-28 NOTE — TELEPHONE ENCOUNTER
Retail Pharmacy Prior Authorization Team   Phone: [phone]    PA Initiation    Medication: FREESTYLE LEXI 3 PLUS SENSOR MISC  Insurance Company: Other (see comments)Comment:  [facility] [place]  Pharmacy Filling the Rx: [facility] #[id number] - [place], [place] - [address]  Filling Pharmacy Phone: [phone]  Filling Pharmacy Fax:    Start Date: 10/28/2024

## 2024-10-28 NOTE — TELEPHONE ENCOUNTER
Retail Pharmacy Prior Authorization Team   Phone: [phone]    PA Initiation    Medication: SILDENAFIL CITRATE 100 MG PO TABS  Insurance Company: Other (see comments)Comment:  [employer] Medicaid  Pharmacy Filling the Rx: [facility] #[id number] - [place], [place] - [address] [place] [address]  Filling Pharmacy Phone: [phone]  Filling Pharmacy Fax:    Start Date: 10/28/2024

## 2024-10-29 NOTE — TELEPHONE ENCOUNTER
Prior Authorization Approval    Authorization Effective Date: 10/28/2024  Authorization Expiration Date: 10/28/2025  Medication: FREESTYLE LEXI 3 PLUS SENSOR-APPROVED  Reference #:     Insurance Company: Other (see comments)Comment:  [employer] Medicaid  Which Pharmacy is filling the prescription (Not needed for infusion/clinic administered): [facility] #[id number] - [place], [place] - [address] [facility]  Pharmacy Notified: Yes  Patient Notified: Instructed pharmacy to notify patient when script is ready to /ship.

## 2024-10-29 NOTE — TELEPHONE ENCOUNTER
PRIOR AUTHORIZATION DENIED    Medication: SILDENAFIL CITRATE 100 MG PO TABS  Insurance Company: Other (see comments)Comment:  [employer]  Denial Date: 10/28/2024  Denial Rational:         Appeal Information: Drug exclusions can not be appealed. This medication will not be covered by the prescription plan for any reason. The drug is not on formulary and there are no loopholes to gaining approval.     Patient Notified: No

## 2024-10-31 DIAGNOSIS — E11.65 TYPE 2 DIABETES MELLITUS WITH HYPERGLYCEMIA, WITHOUT LONG-TERM CURRENT USE OF INSULIN (H): ICD-10-CM

## 2024-10-31 DIAGNOSIS — E11.69 TYPE 2 DIABETES MELLITUS WITH OTHER SPECIFIED COMPLICATION, UNSPECIFIED WHETHER LONG TERM INSULIN USE (H): ICD-10-CM

## 2024-10-31 RX ORDER — GABAPENTIN 100 MG/1
100 CAPSULE ORAL 3 TIMES DAILY
Qty: 30 CAPSULE | Refills: 0 | Status: SHIPPED | OUTPATIENT
Start: 2024-10-31

## 2024-11-10 DIAGNOSIS — E11.65 TYPE 2 DIABETES MELLITUS WITH HYPERGLYCEMIA, WITHOUT LONG-TERM CURRENT USE OF INSULIN (H): ICD-10-CM

## 2024-11-10 DIAGNOSIS — E11.69 TYPE 2 DIABETES MELLITUS WITH OTHER SPECIFIED COMPLICATION, UNSPECIFIED WHETHER LONG TERM INSULIN USE (H): ICD-10-CM

## 2024-11-10 RX ORDER — GABAPENTIN 100 MG/1
100 CAPSULE ORAL 3 TIMES DAILY
Qty: 30 CAPSULE | Refills: 0 | OUTPATIENT
Start: 2024-11-10

## 2024-11-11 ENCOUNTER — TELEPHONE (OUTPATIENT)
Dept: INTERNAL MEDICINE | Facility: CLINIC | Age: 44
End: 2024-11-11
Payer: COMMERCIAL

## 2024-11-11 NOTE — TELEPHONE ENCOUNTER
Ghazala with [employer] calling regarding patient's Cologuard order.  Ghazala requesting alternate ICD codes.  Ghazala states they are needing verbal order for Screening for malignant neoplasms, colon Z12.11 and/or screening for malignant neoplasms, rectum Z12.12.     Please do not reorder, but return call with verbal ok for ICD code to  [phone]   Case number [id number]